# Patient Record
Sex: FEMALE | Race: WHITE | Employment: UNEMPLOYED | URBAN - METROPOLITAN AREA
[De-identification: names, ages, dates, MRNs, and addresses within clinical notes are randomized per-mention and may not be internally consistent; named-entity substitution may affect disease eponyms.]

---

## 2022-02-28 ENCOUNTER — HOSPITAL ENCOUNTER (INPATIENT)
Age: 42
LOS: 8 days | Discharge: HOME | DRG: 751 | End: 2022-03-09
Attending: EMERGENCY MEDICINE | Admitting: PSYCHIATRY & NEUROLOGY
Payer: MEDICARE

## 2022-02-28 DIAGNOSIS — F41.1 GAD (GENERALIZED ANXIETY DISORDER): ICD-10-CM

## 2022-02-28 DIAGNOSIS — T14.91XA SUICIDE ATTEMPT (HCC): Primary | ICD-10-CM

## 2022-02-28 DIAGNOSIS — F32.A DEPRESSION, UNSPECIFIED DEPRESSION TYPE: ICD-10-CM

## 2022-02-28 PROBLEM — F32.9 MAJOR DEPRESSIVE DISORDER WITHOUT PSYCHOTIC FEATURES: Status: ACTIVE | Noted: 2022-02-28

## 2022-02-28 LAB
A/G RATIO: 1.1 (ref 1.1–2.2)
ACETAMINOPHEN LEVEL: <5 UG/ML (ref 10–30)
ALBUMIN SERPL-MCNC: 3.8 G/DL (ref 3.4–5)
ALP BLD-CCNC: 156 U/L (ref 40–129)
ALT SERPL-CCNC: 34 U/L (ref 10–40)
AMPHETAMINE SCREEN, URINE: ABNORMAL
ANION GAP SERPL CALCULATED.3IONS-SCNC: 11 MMOL/L (ref 3–16)
AST SERPL-CCNC: 33 U/L (ref 15–37)
BARBITURATE SCREEN URINE: ABNORMAL
BASOPHILS ABSOLUTE: 0.1 K/UL (ref 0–0.2)
BASOPHILS RELATIVE PERCENT: 0.8 %
BENZODIAZEPINE SCREEN, URINE: POSITIVE
BILIRUB SERPL-MCNC: 0.3 MG/DL (ref 0–1)
BUN BLDV-MCNC: 6 MG/DL (ref 7–20)
CALCIUM SERPL-MCNC: 9.1 MG/DL (ref 8.3–10.6)
CANNABINOID SCREEN URINE: ABNORMAL
CHLORIDE BLD-SCNC: 105 MMOL/L (ref 99–110)
CO2: 24 MMOL/L (ref 21–32)
COCAINE METABOLITE SCREEN URINE: ABNORMAL
CREAT SERPL-MCNC: 0.7 MG/DL (ref 0.6–1.1)
EOSINOPHILS ABSOLUTE: 0.1 K/UL (ref 0–0.6)
EOSINOPHILS RELATIVE PERCENT: 0.8 %
ETHANOL: NORMAL MG/DL (ref 0–0.08)
GFR AFRICAN AMERICAN: >60
GFR NON-AFRICAN AMERICAN: >60
GLUCOSE BLD-MCNC: 115 MG/DL (ref 70–99)
HCG QUALITATIVE: NEGATIVE
HCT VFR BLD CALC: 45.2 % (ref 36–48)
HEMOGLOBIN: 15.4 G/DL (ref 12–16)
LYMPHOCYTES ABSOLUTE: 0.8 K/UL (ref 1–5.1)
LYMPHOCYTES RELATIVE PERCENT: 11.4 %
Lab: ABNORMAL
MCH RBC QN AUTO: 29.4 PG (ref 26–34)
MCHC RBC AUTO-ENTMCNC: 34.1 G/DL (ref 31–36)
MCV RBC AUTO: 86.3 FL (ref 80–100)
METHADONE SCREEN, URINE: ABNORMAL
MONOCYTES ABSOLUTE: 0.5 K/UL (ref 0–1.3)
MONOCYTES RELATIVE PERCENT: 7.2 %
NEUTROPHILS ABSOLUTE: 5.8 K/UL (ref 1.7–7.7)
NEUTROPHILS RELATIVE PERCENT: 79.8 %
OPIATE SCREEN URINE: ABNORMAL
OXYCODONE URINE: ABNORMAL
PDW BLD-RTO: 13.3 % (ref 12.4–15.4)
PH UA: 7
PHENCYCLIDINE SCREEN URINE: ABNORMAL
PLATELET # BLD: 251 K/UL (ref 135–450)
PMV BLD AUTO: 9 FL (ref 5–10.5)
POTASSIUM REFLEX MAGNESIUM: 3.9 MMOL/L (ref 3.5–5.1)
PROPOXYPHENE SCREEN: ABNORMAL
RBC # BLD: 5.24 M/UL (ref 4–5.2)
SALICYLATE, SERUM: 0.7 MG/DL (ref 15–30)
SALICYLATE, SERUM: <0.3 MG/DL (ref 15–30)
SARS-COV-2, NAAT: NOT DETECTED
SODIUM BLD-SCNC: 140 MMOL/L (ref 136–145)
TOTAL PROTEIN: 7.3 G/DL (ref 6.4–8.2)
WBC # BLD: 7.2 K/UL (ref 4–11)

## 2022-02-28 PROCEDURE — 82077 ASSAY SPEC XCP UR&BREATH IA: CPT

## 2022-02-28 PROCEDURE — 84443 ASSAY THYROID STIM HORMONE: CPT

## 2022-02-28 PROCEDURE — 84439 ASSAY OF FREE THYROXINE: CPT

## 2022-02-28 PROCEDURE — 85025 COMPLETE CBC W/AUTO DIFF WBC: CPT

## 2022-02-28 PROCEDURE — 93005 ELECTROCARDIOGRAM TRACING: CPT | Performed by: EMERGENCY MEDICINE

## 2022-02-28 PROCEDURE — 80307 DRUG TEST PRSMV CHEM ANLYZR: CPT

## 2022-02-28 PROCEDURE — 80143 DRUG ASSAY ACETAMINOPHEN: CPT

## 2022-02-28 PROCEDURE — 6370000000 HC RX 637 (ALT 250 FOR IP): Performed by: EMERGENCY MEDICINE

## 2022-02-28 PROCEDURE — 80061 LIPID PANEL: CPT

## 2022-02-28 PROCEDURE — 87635 SARS-COV-2 COVID-19 AMP PRB: CPT

## 2022-02-28 PROCEDURE — 83036 HEMOGLOBIN GLYCOSYLATED A1C: CPT

## 2022-02-28 PROCEDURE — 80179 DRUG ASSAY SALICYLATE: CPT

## 2022-02-28 PROCEDURE — 99285 EMERGENCY DEPT VISIT HI MDM: CPT

## 2022-02-28 PROCEDURE — 36415 COLL VENOUS BLD VENIPUNCTURE: CPT

## 2022-02-28 PROCEDURE — 84703 CHORIONIC GONADOTROPIN ASSAY: CPT

## 2022-02-28 PROCEDURE — 80053 COMPREHEN METABOLIC PANEL: CPT

## 2022-02-28 RX ORDER — LORAZEPAM 1 MG/1
1 TABLET ORAL ONCE
Status: COMPLETED | OUTPATIENT
Start: 2022-02-28 | End: 2022-02-28

## 2022-02-28 RX ORDER — HYDROXYZINE HYDROCHLORIDE 10 MG/1
50 TABLET, FILM COATED ORAL 3 TIMES DAILY PRN
Status: DISCONTINUED | OUTPATIENT
Start: 2022-02-28 | End: 2022-03-01

## 2022-02-28 RX ORDER — OLANZAPINE 10 MG/1
10 TABLET ORAL EVERY 8 HOURS PRN
Status: DISCONTINUED | OUTPATIENT
Start: 2022-02-28 | End: 2022-03-01

## 2022-02-28 RX ORDER — DIPHENHYDRAMINE HYDROCHLORIDE 50 MG/ML
50 INJECTION INTRAMUSCULAR; INTRAVENOUS EVERY 4 HOURS PRN
Status: DISCONTINUED | OUTPATIENT
Start: 2022-02-28 | End: 2022-03-01

## 2022-02-28 RX ORDER — TRAZODONE HYDROCHLORIDE 50 MG/1
50 TABLET ORAL NIGHTLY PRN
Status: DISCONTINUED | OUTPATIENT
Start: 2022-03-01 | End: 2022-03-01

## 2022-02-28 RX ORDER — ACETAMINOPHEN 325 MG/1
650 TABLET ORAL EVERY 4 HOURS PRN
Status: DISCONTINUED | OUTPATIENT
Start: 2022-02-28 | End: 2022-03-01

## 2022-02-28 RX ORDER — POLYETHYLENE GLYCOL 3350 17 G
2 POWDER IN PACKET (EA) ORAL
Status: DISCONTINUED | OUTPATIENT
Start: 2022-02-28 | End: 2022-03-09 | Stop reason: HOSPADM

## 2022-02-28 RX ORDER — IBUPROFEN 400 MG/1
400 TABLET ORAL EVERY 6 HOURS PRN
Status: DISCONTINUED | OUTPATIENT
Start: 2022-02-28 | End: 2022-03-01

## 2022-02-28 RX ORDER — MAGNESIUM HYDROXIDE/ALUMINUM HYDROXICE/SIMETHICONE 120; 1200; 1200 MG/30ML; MG/30ML; MG/30ML
30 SUSPENSION ORAL EVERY 6 HOURS PRN
Status: DISCONTINUED | OUTPATIENT
Start: 2022-02-28 | End: 2022-03-09 | Stop reason: HOSPADM

## 2022-02-28 RX ORDER — OLANZAPINE 10 MG/1
10 INJECTION, POWDER, LYOPHILIZED, FOR SOLUTION INTRAMUSCULAR EVERY 8 HOURS PRN
Status: DISCONTINUED | OUTPATIENT
Start: 2022-02-28 | End: 2022-03-01

## 2022-02-28 RX ADMIN — LORAZEPAM 1 MG: 1 TABLET ORAL at 19:44

## 2022-02-28 NOTE — ED PROVIDER NOTES
[address] ED  EMERGENCY DEPARTMENT ENCOUNTER      Pt Name: Wendy Glaser  MRN: [id number]  [place] 1980  Date of evaluation: 2/28/2022  Provider: Sammi Wells MD    CHIEF COMPLAINT       Chief Complaint   Patient presents with    Suicide Attempt     patient has bilateral superficial cuts to wrists. States \"i just dont want to live anymore\". Patient reported to have been struggling with depression and has made a suicidal attempts in the past.          HISTORY OF PRESENT ILLNESS   (Location/Symptom, Timing/Onset, Context/Setting, Quality, Duration, Modifying Factors, Severity)  Note limiting factors. Wendy Glaser is a 42 y.o. female with past medical history of ADHD, anxiety, depression, fibromyalgia and Raynaud's disease here today after suicide attempt    Patient states she has dealt with depression all of her life. States she has had suicidal thoughts for as long as she can remember and has attempted to kill her self numerous times in the past by various methods. She states that last night she was struggling and tried again to commit suicide. States she used a  to cut her wrists bilaterally. Denies any ingestions. States she feels depressed. States there is no specific incident that pushed her to the edge. Denies hallucinations. Denies substance abuse    Tetanus is up-to-date    HPI    Nursing Notes were reviewed. REVIEW OF SYSTEMS    (2-9 systems for level 4, 10 or more for level 5)     Review of Systems    Please see HPI for pertinent positive and negative review of system findings. A full 10 system ROS was performed and otherwise negative. PAST MEDICAL HISTORY     Past Medical History:   Diagnosis Date    ADHD (attention deficit hyperactivity disorder)     Anxiety     Depression     Fibromyalgia     Raynaud's disease          SURGICAL HISTORY     History reviewed. No pertinent surgical history.       CURRENT MEDICATIONS       Previous Medications No medications on file       ALLERGIES     Patient has no known allergies. FAMILY HISTORY     History reviewed. No pertinent family history. SOCIAL HISTORY       Social History     Socioeconomic History    Marital status:      Spouse name: None    Number of children: None    Years of education: None    Highest education level: None   Occupational History    None   Tobacco Use    Smoking status: Current Every Day Smoker     Packs/day: 1.00    Smokeless tobacco: None   Substance and Sexual Activity    Alcohol use: Never    Drug use: Never    Sexual activity: None   Other Topics Concern    None   Social History Narrative    None     Social Determinants of Health     Financial Resource Strain:     Difficulty of Paying Living Expenses: Not on file   Food Insecurity:     Worried About Running Out of Food in the Last Year: Not on file    Amaya of Food in the Last Year: Not on file   Transportation Needs:     Lack of Transportation (Medical): Not on file    Lack of Transportation (Non-Medical):  Not on file   Physical Activity:     Days of Exercise per Week: Not on file    Minutes of Exercise per Session: Not on file   Stress:     Feeling of Stress : Not on file   Social Connections:     Frequency of Communication with Friends and Family: Not on file    Frequency of Social Gatherings with Friends and Family: Not on file    Attends Jainism Services: Not on file    Active Member of [address] [place] [address] or Organizations: Not on file    Attends Club or Organization Meetings: Not on file    Marital Status: Not on file   Intimate Partner Violence:     Fear of Current or Ex-Partner: Not on file    Emotionally Abused: Not on file    Physically Abused: Not on file    Sexually Abused: Not on file   Housing Stability:     Unable to Pay for Housing in the Last Year: Not on file    Number of [place] in the Last Year: Not on file    Unstable Housing in the Last Year: Not on file       SCREENINGS Nicolette Coma Scale  Eye Opening: Spontaneous  Best Verbal Response: Oriented  Best Motor Response: Obeys commands  Nicolette Coma Scale Score: 15          PHYSICAL EXAM    (up to 7 for level 4, 8 or more for level 5)     ED Triage Vitals [02/28/22 1615]   BP Temp Temp src Pulse Resp SpO2 Height Weight   (!) 127/91 -- -- 100 18 95 % 5' 2\" (1.575 m) 180 lb (81.6 kg)       Physical Exam    General appearance:  Cooperative. No acute distress. Skin:  Warm. Dry. Multiple very superficial abrasions and lacerations to the bilateral volar wrists without active bleeding  Eye:  Extraocular movements intact. Ears, nose, mouth and throat:  Oral mucosa moist,  Neck:  Trachea midline. Heart:  Regular rate and rhythm  Perfusion:  intact with 2+ bilateral radial pulses  Respiratory:  Lungs clear to auscultation bilaterally. Respirations nonlabored. Abdominal:   Non distended. Nontender  Neurological:  Alert and oriented x 3. Moves all extremities spontaneously  Musculoskeletal:   Normal ROM, no deformities          Psychiatric: Depressed mood with flat affect.   Denies hallucinations      DIAGNOSTIC RESULTS       Labs Reviewed   CBC WITH AUTO DIFFERENTIAL - Abnormal; Notable for the following components:       Result Value    RBC 5.24 (*)     Lymphocytes Absolute 0.8 (*)     All other components within normal limits    Narrative:     Performed at:  [facility] [address],  [place], [facility]   Phone [phone]   COMPREHENSIVE METABOLIC PANEL W/ REFLEX TO MG FOR LOW K - Abnormal; Notable for the following components:    Glucose 115 (*)     BUN 6 (*)     Alkaline Phosphatase 156 (*)     All other components within normal limits    Narrative:     Performed at:  [facility]) - [facility] [address],  [place], [facility]   Phone [phone]   [address] - Abnormal; Notable for the following components:    Benzodiazepine Screen, Urine POSITIVE (*)     All other components within normal limits    Narrative:     Performed at:  [facility]) - [facility] [address],  [place], [employer]   Phone [phone]   SALICYLATE LEVEL - Abnormal; Notable for the following components:    Salicylate, Serum 0.7 (*)     All other components within normal limits    Narrative:     Performed at:  [facility] [address],  [place], [employer]   Phone [phone]   ACETAMINOPHEN LEVEL - Abnormal; Notable for the following components:    Acetaminophen Level <5 (*)     All other components within normal limits    Narrative:     Performed at:  [place] [address],  [place], [employer]   Phone [phone]   SALICYLATE LEVEL - Abnormal; Notable for the following components:    Salicylate, Serum <2.9 (*)     All other components within normal limits    Narrative:     Performed at:  [facility] [address],  [place], [employer]   Phone [phone], RAPID    Narrative:     Performed at:  [facility] [address],  [place], [employer]   Phone [phone]   HCG, SERUM, QUALITATIVE    Narrative:     Performed at:  [facility] [address],  [place], [employer]   Phone [phone]   ETHANOL    Narrative:     Performed at:  [facility] [address],  [place], [employer]   Phone [phone]       Interpretation per the Radiologist below, if obtained/available at the time of this note:    No orders to display       All other labs/imaging were within normal range or not returned as of this dictation.     EMERGENCY DEPARTMENT COURSE and DIFFERENTIAL DIAGNOSIS/MDM:   Vitals:    Vitals:    02/28/22 1615   BP: (!) 127/91   Pulse: 100   Resp: 18   SpO2: 95%   Weight: 180 lb (81.6 kg)   Height: 5' 2\" (1.575 m)       Patient presents to the emergency department today after reported suicide attempt. Multiple superficial lacerations to the bilateral wrist.  Tetanus up-to-date. Did not require closure. Denies any other coingestions but found to have just slightly elevated salicylate level prompting repeat which was undetectable. Patient medically cleared for admission. Additional history from patient and family note that she has been essentially locked up in her room refusing to leave, bathe, clean herself or even eat at times for over a month. Certainly concerning for significant depression and suicidal thoughts. Psychiatry has plan to admit the patient for further treatment    MDM    CONSULTS     None    Critical Care:   None    REASSESSMENT          PROCEDURE     Unless otherwise noted below, none     Procedures      FINAL IMPRESSION      1. Suicide attempt ([facility] [address]    2. Depression, unspecified depression type            DISPOSITION/PLAN   DISPOSITION Decision To Admit 02/28/2022 09:34:13 PM        PATIENT REFERRED TO:  No follow-up provider specified. DISCHARGE MEDICATIONS:  New Prescriptions    No medications on file     Controlled Substances Monitoring:     No flowsheet data found.     (Please note that portions of this note were completed with a voice recognition program.  Efforts were made to edit the dictations but occasionally words are mis-transcribed.)    Sheryl Emmanuel MD (electronically signed)  Attending Emergency Physician            Vega Trujillo MD  02/28/22 6084

## 2022-03-01 PROBLEM — R79.89 LOW TSH LEVEL: Status: ACTIVE | Noted: 2022-03-01

## 2022-03-01 PROBLEM — F32.9 MAJOR DEPRESSIVE DISORDER WITHOUT PSYCHOTIC FEATURES: Status: RESOLVED | Noted: 2022-02-28 | Resolved: 2022-03-01

## 2022-03-01 PROBLEM — F40.01 PANIC DISORDER WITH AGORAPHOBIA: Status: ACTIVE | Noted: 2022-03-01

## 2022-03-01 PROBLEM — F41.1 GAD (GENERALIZED ANXIETY DISORDER): Status: ACTIVE | Noted: 2022-03-01

## 2022-03-01 PROBLEM — F33.3 MAJOR DEPRESSIVE DISORDER, RECURRENT, SEVERE WITH PSYCHOTIC FEATURES (HCC): Status: ACTIVE | Noted: 2022-03-01

## 2022-03-01 PROBLEM — G43.909 MIGRAINES: Status: ACTIVE | Noted: 2022-03-01

## 2022-03-01 LAB
CHOLESTEROL, TOTAL: 270 MG/DL (ref 0–199)
EKG ATRIAL RATE: 87 BPM
EKG DIAGNOSIS: NORMAL
EKG P AXIS: 4 DEGREES
EKG P-R INTERVAL: 134 MS
EKG Q-T INTERVAL: 376 MS
EKG QRS DURATION: 72 MS
EKG QTC CALCULATION (BAZETT): 452 MS
EKG R AXIS: 40 DEGREES
EKG T AXIS: 27 DEGREES
EKG VENTRICULAR RATE: 87 BPM
HDLC SERPL-MCNC: 37 MG/DL (ref 40–60)
LDL CHOLESTEROL CALCULATED: 196 MG/DL
T4 FREE: 1 NG/DL (ref 0.9–1.8)
TRIGL SERPL-MCNC: 185 MG/DL (ref 0–150)
TSH SERPL DL<=0.05 MIU/L-ACNC: <0.01 UIU/ML (ref 0.27–4.2)
VLDLC SERPL CALC-MCNC: 37 MG/DL

## 2022-03-01 PROCEDURE — 99221 1ST HOSP IP/OBS SF/LOW 40: CPT | Performed by: NURSE PRACTITIONER

## 2022-03-01 PROCEDURE — 1240000000 HC EMOTIONAL WELLNESS R&B

## 2022-03-01 PROCEDURE — 99223 1ST HOSP IP/OBS HIGH 75: CPT | Performed by: PSYCHIATRY & NEUROLOGY

## 2022-03-01 PROCEDURE — 6370000000 HC RX 637 (ALT 250 FOR IP)

## 2022-03-01 PROCEDURE — 6370000000 HC RX 637 (ALT 250 FOR IP): Performed by: PSYCHIATRY & NEUROLOGY

## 2022-03-01 PROCEDURE — 93010 ELECTROCARDIOGRAM REPORT: CPT | Performed by: INTERNAL MEDICINE

## 2022-03-01 RX ORDER — TRAZODONE HYDROCHLORIDE 50 MG/1
50 TABLET ORAL ONCE
Status: COMPLETED | OUTPATIENT
Start: 2022-03-01 | End: 2022-03-01

## 2022-03-01 RX ORDER — ACETAMINOPHEN 325 MG/1
650 TABLET ORAL EVERY 4 HOURS PRN
Status: DISCONTINUED | OUTPATIENT
Start: 2022-03-01 | End: 2022-03-09 | Stop reason: HOSPADM

## 2022-03-01 RX ORDER — DEXTROAMPHETAMINE SACCHARATE, AMPHETAMINE ASPARTATE, DEXTROAMPHETAMINE SULFATE AND AMPHETAMINE SULFATE 7.5; 7.5; 7.5; 7.5 MG/1; MG/1; MG/1; MG/1
30 TABLET ORAL DAILY
Status: ON HOLD | COMMUNITY
Start: 2022-02-28 | End: 2022-03-09 | Stop reason: HOSPADM

## 2022-03-01 RX ORDER — MIRTAZAPINE 45 MG/1
45 TABLET, FILM COATED ORAL NIGHTLY
Status: ON HOLD | COMMUNITY
Start: 2022-02-28 | End: 2022-03-09 | Stop reason: HOSPADM

## 2022-03-01 RX ORDER — ALPRAZOLAM 1 MG/1
2 TABLET ORAL 3 TIMES DAILY PRN
Status: DISCONTINUED | OUTPATIENT
Start: 2022-03-01 | End: 2022-03-04

## 2022-03-01 RX ORDER — VENLAFAXINE HYDROCHLORIDE 75 MG/1
75 CAPSULE, EXTENDED RELEASE ORAL
Status: COMPLETED | OUTPATIENT
Start: 2022-03-01 | End: 2022-03-01

## 2022-03-01 RX ORDER — VENLAFAXINE HYDROCHLORIDE 75 MG/1
150 CAPSULE, EXTENDED RELEASE ORAL
Status: DISCONTINUED | OUTPATIENT
Start: 2022-03-02 | End: 2022-03-03

## 2022-03-01 RX ORDER — ALPRAZOLAM 2 MG/1
2 TABLET ORAL 3 TIMES DAILY PRN
Status: ON HOLD | COMMUNITY
Start: 2022-02-28 | End: 2022-03-09 | Stop reason: HOSPADM

## 2022-03-01 RX ORDER — DEXTROAMPHETAMINE SACCHARATE, AMPHETAMINE ASPARTATE, DEXTROAMPHETAMINE SULFATE AND AMPHETAMINE SULFATE 2.5; 2.5; 2.5; 2.5 MG/1; MG/1; MG/1; MG/1
30 TABLET ORAL DAILY
Status: DISCONTINUED | OUTPATIENT
Start: 2022-03-01 | End: 2022-03-07

## 2022-03-01 RX ADMIN — HYDROXYZINE HYDROCHLORIDE 50 MG: 10 TABLET ORAL at 03:06

## 2022-03-01 RX ADMIN — ACETAMINOPHEN 650 MG: 325 TABLET ORAL at 18:34

## 2022-03-01 RX ADMIN — DEXTROAMPHETAMINE SACCHARATE, AMPHETAMINE ASPARTATE, DEXTROAMPHETAMINE SULFATE AND AMPHETAMINE SULFATE 30 MG: 2.5; 2.5; 2.5; 2.5 TABLET ORAL at 10:26

## 2022-03-01 RX ADMIN — VENLAFAXINE HYDROCHLORIDE 75 MG: 75 CAPSULE, EXTENDED RELEASE ORAL at 14:32

## 2022-03-01 RX ADMIN — ALPRAZOLAM 2 MG: 1 TABLET ORAL at 10:26

## 2022-03-01 RX ADMIN — ALPRAZOLAM 2 MG: 1 TABLET ORAL at 20:56

## 2022-03-01 RX ADMIN — TRAZODONE HYDROCHLORIDE 50 MG: 50 TABLET ORAL at 03:06

## 2022-03-01 ASSESSMENT — SLEEP AND FATIGUE QUESTIONNAIRES
DIFFICULTY FALLING ASLEEP: YES
DIFFICULTY ARISING: NO
DO YOU USE A SLEEP AID: YES
DO YOU USE A SLEEP AID: YES
RESTFUL SLEEP: NO
SLEEP PATTERN: DIFFICULTY FALLING ASLEEP;RESTLESSNESS;NIGHTMARES/TERRORS;INSOMNIA
AVERAGE NUMBER OF SLEEP HOURS: 4
DO YOU HAVE DIFFICULTY SLEEPING: YES
DIFFICULTY STAYING ASLEEP: YES
DO YOU HAVE DIFFICULTY SLEEPING: YES
AVERAGE NUMBER OF SLEEP HOURS: 4
DIFFICULTY FALLING ASLEEP: YES
RESTFUL SLEEP: NO
DIFFICULTY STAYING ASLEEP: YES
DIFFICULTY ARISING: NO

## 2022-03-01 ASSESSMENT — LIFESTYLE VARIABLES
HISTORY_ALCOHOL_USE: NO
HISTORY_ALCOHOL_USE: NO

## 2022-03-01 ASSESSMENT — PAIN SCALES - GENERAL
PAINLEVEL_OUTOF10: 0
PAINLEVEL_OUTOF10: 6

## 2022-03-01 ASSESSMENT — PATIENT HEALTH QUESTIONNAIRE - PHQ9: SUM OF ALL RESPONSES TO PHQ QUESTIONS 1-9: 25
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
## 2022-03-01 NOTE — PROGRESS NOTES
Patient complaining of burning neck pain radiating down her back. States her pain is a 6 on a 1-10. Tylenol given per order.   See [facility]

## 2022-03-01 NOTE — BH NOTE
Medication verified with pharmacy. Only difference is Adderall is supposed to be taken two times daily (BID).

## 2022-03-01 NOTE — H&P
Hospital Medicine History & Physical      PCP: No primary care provider on file. Date of Admission: 2/28/2022    Date of Service: Pt seen/examined on 3/1/2022     Chief Complaint:    Chief Complaint   Patient presents with    Suicide Attempt     patient has bilateral superficial cuts to wrists. States \"i just dont want to live anymore\". Patient reported to have been struggling with depression and has made a suicidal attempts in the past.        History Of Present Illness: The patient is a 42 y.o. female with Raynaud's, fibromyalgia, depression, anxiety, ADHD who presented to [facility] ED with complaint of Suicide attempt by cutting her wrists. Patient was seen and evaluated in the ED by the ED medical provider, patient was medically cleared for admission to [facility] at [facility]. This note serves as an admission medical H&P.    PCP: No primary care provider on file. Tobacco use: current  ETOH use: denies  Illicit drug use: denies    Patient denies any symptoms/complaints. Past Medical History:        Diagnosis Date    ADHD (attention deficit hyperactivity disorder)     Anxiety     Depression     Fibromyalgia     Raynaud's disease        Past Surgical History:    History reviewed. No pertinent surgical history. Medications Prior to Admission:    Prior to Admission medications    Medication Sig Start Date End Date Taking? Authorizing Provider   ALPRAZolam Chana Nice) 2 MG tablet Take 2 mg by mouth 3 times daily as needed for Anxiety. 2/28/22  Yes Historical Provider, MD   amphetamine-dextroamphetamine (ADDERALL) 30 MG tablet Take 30 mg by mouth daily. 2/28/22  Yes Historical Provider, MD   mirtazapine (REMERON) 45 MG tablet Take 45 mg by mouth nightly 2/28/22  Yes Historical Provider, MD       Allergies:  Patient has no known allergies. Social History:     TOBACCO:   reports that she has been smoking cigarettes. She started smoking about 20 years ago. She has a 10.00 pack-year smoking history.  She has never used smokeless tobacco.  ETOH:   reports no history of alcohol use. Family History:   Positive as follows:    History reviewed. No pertinent family history. REVIEW OF SYSTEMS:       Constitutional: Negative for fever   HENT: Negative for sore throat   Respiratory: Negative  for dyspnea, cough   Cardiovascular: Negative for chest pain   Gastrointestinal: Negative for vomiting, diarrhea   Genitourinary: Negative for dysuria  Musculoskeletal: Negative for arthralgias   Skin: Negative for rash   Neurological: Negative for syncope   Psychiatric/Behavorial: per psychiatric evaluation    PHYSICAL EXAM:    BP 97/71   Pulse 81   Temp 97 °F (36.1 °C) (Temporal)   Resp 16   Ht 5' 2\" (1.575 m)   Wt 180 lb (81.6 kg)   LMP  (LMP Unknown)   SpO2 97%   Breastfeeding No   BMI 32.92 kg/m²     Gen: No distress. Alert. Eyes: PERRL. No sclera icterus. No conjunctival injection. ENT: No discharge. Pharynx clear. Neck:  Trachea midline. Resp: No accessory muscle use. No crackles. No wheezes. No rhonchi. CV: Regular rate. Regular rhythm. No murmur. No rub. No edema. GI: Non-tender. Non-distended. Normal bowel sounds. Skin: Warm and dry. No nodule on exposed extremities. No rash on exposed extremities. M/S: No cyanosis. No joint deformity. No clubbing. Neuro: Awake. No focal neurologic deficit on exam.  Cranial nerves II through XII intact. Patient is able to ambulate without difficulty.   Psych: Per psychiatry team evaluation       CBC:   Recent Labs     02/28/22  1634   WBC 7.2   HGB 15.4   HCT 45.2   MCV 86.3        BMP:   Recent Labs     02/28/22  1634      K 3.9      CO2 24   BUN 6*   CREATININE 0.7     LIVER PROFILE:   Recent Labs     02/28/22  1634   AST 33   ALT 34   BILITOT 0.3   ALKPHOS 156*     UA:  Recent Labs     02/28/22  1726   PHUR 7.0         CULTURES  COVID: not detected    EKG:  I have reviewed the EKG with the following interpretation:   Normal sinus rhythm, Baseline artifact, Nonspecific ST abnormality    ASSESSMENT/PLAN:  Fibromyalgia, raynaud's disease  - supportive care measures. Tylenol prn    Depression, anxiety, SI  - management per psychiatry    Tobacco Dependence  -Recommended cessation  - Nicotine lozenge prn    Pt has no medical complaints at this time. They were informed that should a medical concern arise during their admission they may have I contact us.     Ramses Savage FNP-C  3/1/2022 9:59 AM

## 2022-03-01 NOTE — PLAN OF CARE
Problem: Suicide risk  Goal: Provide patient with safe environment  Description: Provide patient with safe environment  Outcome: Ongoing     Problem: Tobacco Use:  Goal: Inpatient tobacco use cessation counseling participation  Description: Inpatient tobacco use cessation counseling participation  Outcome: Ongoing     Problem: Anxiety:  Goal: Level of anxiety will decrease  Description: Level of anxiety will decrease  Outcome: Ongoing     Problem: Pain:  Goal: Pain level will decrease  Description: Pain level will decrease  Outcome: Ongoing  Goal: Control of acute pain  Description: Control of acute pain  Outcome: Ongoing  Goal: Control of chronic pain  Description: Control of chronic pain  Outcome: Ongoing     Problem: Altered Mood, Depressive Behavior:  Goal: Able to verbalize acceptance of life and situations over which he or she has no control  Description: Able to verbalize acceptance of life and situations over which he or she has no control  Outcome: Ongoing  Goal: Able to verbalize and/or display a decrease in depressive symptoms  Description: Able to verbalize and/or display a decrease in depressive symptoms  Outcome: Ongoing  Goal: Able to verbalize support systems  Description: Able to verbalize support systems  Outcome: Ongoing  Goal: Absence of self-harm  Description: Absence of self-harm  Outcome: Ongoing  Goal: Participates in care planning  Description: Participates in care planning  Outcome: Ongoing    Patient is interactive with staff. Patient denies SI/HI/AVH. Patient states, 'I had trouble sleeping last night. \" Patient verbalizes they will notify staff if suicidal thoughts worsen or is unable to CFS. She reports feeling \"tired and anxious. \" Patient compliant with medications. Patient is withdrawn to bed and room.

## 2022-03-01 NOTE — BH NOTE
Going through patient's belongings - 2 pills found in patient's jacket pocket. Contacted pharmacy - they stated they are 30 mg of Adderall. Two pills put in lock box [facility]
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
## 2022-03-01 NOTE — CARE COORDINATION
[facility]  Treatment Team Note  Day 1    Review Date & Time: 0900  3/1/2022    Patient was not in treatment team      Status EXAM:   Status and Exam  Normal: No  Facial Expression: Sad  Affect: Unstable  Level of Consciousness: Alert  Mood:Normal: No  Mood: Depressed,Anxious,Sad  Motor Activity:Normal: Yes  Motor Activity: Decreased  Interview Behavior: Cooperative  Preception: [place] to Person,[place] to Place,[place] to [place] to Time  Attention:Normal: Yes  Attention: Distractible  Thought Processes: Circumstantial  Thought Content:Normal: Yes  Hallucinations: None  Delusions: No  Memory:Normal: Yes  Insight and Judgment: No  Insight and Judgment: Poor Judgment,Poor Insight  Present Suicidal Ideation: No  Present Homicidal Ideation: No      Suicide Risk CSSR-S:  1) Within the past month, have you wished you were dead or wished you could go to sleep and not wake up? : Yes  2) Have you actually had any thoughts of killing yourself? : Yes  3) Have you been thinking about how you might kill yourself? : Yes  5) Have you started to work out or worked out the details of how to kill yourself?  Do you intend to carry out this plan? : Yes  6) Have you ever done anything, started to do anything, or prepared to do anything to end your life?: Yes      PLAN/TREATMENT RECOMMENDATIONS UPDATE: Patient will take medication as prescribed, eat 75% of meals, attend groups, participate in milieu activities, participate in treatment team and care planning for discharge and follow up.,      Yang Sanchez RN

## 2022-03-01 NOTE — PROGRESS NOTES
skills (new ways to manage stress, exercise, relaxation techniques, changing routine, distraction)                                                           ( X)  Basic information about quitting (benefits of quitting, techniques in how to quit, available resources  ( ) Referral for counseling faxed to [facility]                                           ( ) Patient refused counseling  ( ) Patient has not smoked in the last 30 days    Metabolic Screening:    No results found for: LABA1C    No results found for: CHOL  No results found for: TRIG  No results found for: HDL  No components found for: LDLCAL  No results found for: LABVLDL      Body mass index is 32.92 kg/m². BP Readings from Last 2 Encounters:   03/01/22 109/80           Pt admitted with followings belongings:  Dental Appliances: None  Vision - Corrective Lenses: None  Hearing Aid: None  Jewelry: None  Body Piercings Removed: N/A  Clothing: Footwear,Pants,Shirt,Undergarments (Comment) (1 sock)  Were All Patient Medications Collected?: Yes  Other Valuables: Purse,Cell phone,Wallet ( make up)     Patient's home medication was noted, recorded and placed in her locker. Patient oriented to surroundings and program expectations and copy of patient rights given. Received admission packet:  YES. Consents reviewed, signed YES. Refused voluntary, SHIRLEY. Patient verbalize understanding:  YES.   Patient education on precautions: Osbaldo Dias RN
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
## 2022-03-01 NOTE — ED NOTES
Level of Care Disposition: Admit      Patient was seen by ED provider and [facility] staff. The case presented to psychiatric provider on-call Connie Nurse, APRN-JIMBO. Based on the ED evaluation and information presented to the provider by Jerry Haro it is the recommendation of the on call psychiatric provider that inpatient hospitalization is the least restrictive environment for the patient at this time. The patient will be admitted to the inpatient unit. Admitting provider did not order suicide precautions based on patient reuben for safety.            Erin Clark RN  02/28/22 [id number]

## 2022-03-01 NOTE — PROGRESS NOTES
Behavioral Services  Medicare Certification Upon Admission    I certify that this patient's inpatient psychiatric hospital admission is medically necessary for:    [x] (1) Treatment which could reasonably be expected to improve this patient's condition,       [x] (2) Or for diagnostic study;     AND     [x](2) The inpatient psychiatric services are provided while the individual is under the care of a physician and are included in the individualized plan of care.     Estimated length of stay/service 5-8 days    Plan for post-hospital care incomplete     Electronically signed by Kristie Alfred MD on 3/1/2022 at 2:14 PM

## 2022-03-02 LAB
ESTIMATED AVERAGE GLUCOSE: 102.5 MG/DL
HBA1C MFR BLD: 5.2 %

## 2022-03-02 PROCEDURE — 6370000000 HC RX 637 (ALT 250 FOR IP): Performed by: NURSE PRACTITIONER

## 2022-03-02 PROCEDURE — 99233 SBSQ HOSP IP/OBS HIGH 50: CPT | Performed by: PSYCHIATRY & NEUROLOGY

## 2022-03-02 PROCEDURE — 1240000000 HC EMOTIONAL WELLNESS R&B

## 2022-03-02 PROCEDURE — 6370000000 HC RX 637 (ALT 250 FOR IP): Performed by: PSYCHIATRY & NEUROLOGY

## 2022-03-02 RX ORDER — ACETAMINOPHEN, ASPIRIN AND CAFFEINE 250; 250; 65 MG/1; MG/1; MG/1
1 TABLET, FILM COATED ORAL 2 TIMES DAILY PRN
Status: DISCONTINUED | OUTPATIENT
Start: 2022-03-02 | End: 2022-03-09 | Stop reason: HOSPADM

## 2022-03-02 RX ORDER — TRAZODONE HYDROCHLORIDE 50 MG/1
50 TABLET ORAL ONCE
Status: COMPLETED | OUTPATIENT
Start: 2022-03-02 | End: 2022-03-02

## 2022-03-02 RX ADMIN — DEXTROAMPHETAMINE SACCHARATE, AMPHETAMINE ASPARTATE, DEXTROAMPHETAMINE SULFATE AND AMPHETAMINE SULFATE 30 MG: 2.5; 2.5; 2.5; 2.5 TABLET ORAL at 08:47

## 2022-03-02 RX ADMIN — TRAZODONE HYDROCHLORIDE 50 MG: 50 TABLET ORAL at 22:29

## 2022-03-02 RX ADMIN — ACETAMINOPHEN 650 MG: 325 TABLET ORAL at 08:57

## 2022-03-02 RX ADMIN — ALPRAZOLAM 2 MG: 1 TABLET ORAL at 15:18

## 2022-03-02 RX ADMIN — VENLAFAXINE HYDROCHLORIDE 150 MG: 75 CAPSULE, EXTENDED RELEASE ORAL at 08:47

## 2022-03-02 RX ADMIN — ALPRAZOLAM 2 MG: 1 TABLET ORAL at 08:47

## 2022-03-02 RX ADMIN — ACETAMINOPHEN, ASPIRIN (NSAID) AND CAFFEINE 1 TABLET: 250; 250; 65 TABLET, FILM COATED ORAL at 12:48

## 2022-03-02 ASSESSMENT — PAIN SCALES - GENERAL
PAINLEVEL_OUTOF10: 0
PAINLEVEL_OUTOF10: 8
PAINLEVEL_OUTOF10: 2
PAINLEVEL_OUTOF10: 4
PAINLEVEL_OUTOF10: 5

## 2022-03-02 NOTE — PLAN OF CARE
Patient alert and oriented x 3. Patient rates Depression 10/10 and Anxiety 8/10. Patient denies SI/HI/A/V/H. Patient seclusive to her bed and room this evening. Patient refused to attended wrap up group. Patient doesn't have HS medications scheduled. Patient talking to her daughter on the phone was tearful and became anxious, because her dog is going to be put down tomorrow. 20:56 Patient requesting her Xanax for anxiety. Patient rates anxiety 7/10. Patient medicated with Xanax 2 mg po for anxiety. Patient resting quietly in bed.

## 2022-03-02 NOTE — BH NOTE
Pt requesting Excedrin for pain, per patient this is the only one that usually works. Communicated request to Hospitalist LORY.

## 2022-03-02 NOTE — FLOWSHEET NOTE
Purposeful Rounding    Patient Location: Patient room    Patient willing to engage in conversation: Yes    Presentation/behavior: Guarded/Suspicious and Withdrawn    Affect: Flat/blunted and Constricted    Concerns reported: None    PRN medications given: None    Environmental assessment: Room free from clutter, Clear path to bathroom  and Adequate lighting    Fall prevention interventions in place: Yellow non-skid socks on    Daily [place] Fall Risk Score: 47    Daily [place] Fall Risk Score: Low risk      Electronically signed by Annabelle Melara RN on 3/1/22 at 8:25 PM EST

## 2022-03-02 NOTE — FLOWSHEET NOTE
Purposeful Rounding    Patient Location: Day room    Patient willing to engage in conversation: Yes    Presentation/behavior: Anxious    Affect: Anxious    Concerns reported: pt tearful, asking if her daughter could still come and visit today. Patient educated on visiting hours.     PRN medications given: n/a    Environmental assessment: Room free from clutter, Clear path to bathroom  and No safety hazards noted    Fall prevention interventions in place: Yellow non-skid socks on    Daily [place] Fall Risk Score: 81    Daily [place] Fall Risk Score: 15-low      Electronically signed by Lory Dakins, RN on 3/1/22 at 7:09 PM EST

## 2022-03-02 NOTE — BH NOTE
BHI Shift Note     Shift: Day    Meals: 15 of breakfast,   Sleep: None, pt reports poor sleep overnight and     Is the Patient Amanda for Safety: Yes If no, what intervention?: n/a    Thought Process: Linear  Thought Content: Preoccupied    Hallucinations: None Explain: None noted or reported, she states when she does not sleep for days at a time \"I see things\" but not currently. Delusions: None Explain: None noted or reported. Groups Attended: Pt declined groups this shift, per pt she does not like being around big groups and doesn't like \"a lot of noise\"      Medication Compliant: Yes    PRNs Given:Tylenol, Excedrin     Narrative: Corazon Ellis was anxious,depressed, and tearful on assessment. She verbalized not being able to sleep overnight, pt patient she stayed awake overnight and read a book. She was tearful at times when talking about her past situation of being  and being homeless with kids when they were young. She also expressed feeling sad about her dog having to be put down due to having cancer, she reports this being as her main support as all her children have moved out from home.

## 2022-03-02 NOTE — FLOWSHEET NOTE
Purposeful Rounding    Patient Location: Patient room    Patient willing to engage in conversation: Yes    Presentation/behavior: Withdrawn    Affect: Flat/blunted    Concerns reported: pt requesting her water pitcher be filled.   Water pitcher provided    PRN medications given: n/a    Environmental assessment: Room free from clutter, Clear path to bathroom  and No safety hazards noted    Fall prevention interventions in place: Yellow non-skid socks on    Daily [place] Fall Risk Score: 81    Daily [place] Fall Risk Score: 15-low      Electronically signed by Isidoro Hebert RN on 3/1/22 at 7:11 PM EST

## 2022-03-03 PROCEDURE — 6370000000 HC RX 637 (ALT 250 FOR IP): Performed by: PSYCHIATRY & NEUROLOGY

## 2022-03-03 PROCEDURE — 99233 SBSQ HOSP IP/OBS HIGH 50: CPT | Performed by: PSYCHIATRY & NEUROLOGY

## 2022-03-03 PROCEDURE — 6370000000 HC RX 637 (ALT 250 FOR IP): Performed by: NURSE PRACTITIONER

## 2022-03-03 PROCEDURE — 1240000000 HC EMOTIONAL WELLNESS R&B

## 2022-03-03 RX ORDER — TRAZODONE HYDROCHLORIDE 50 MG/1
50 TABLET ORAL NIGHTLY PRN
Status: DISCONTINUED | OUTPATIENT
Start: 2022-03-03 | End: 2022-03-09 | Stop reason: HOSPADM

## 2022-03-03 RX ORDER — VENLAFAXINE HYDROCHLORIDE 75 MG/1
225 CAPSULE, EXTENDED RELEASE ORAL
Status: DISCONTINUED | OUTPATIENT
Start: 2022-03-04 | End: 2022-03-08

## 2022-03-03 RX ADMIN — ALPRAZOLAM 2 MG: 1 TABLET ORAL at 00:54

## 2022-03-03 RX ADMIN — ACETAMINOPHEN, ASPIRIN (NSAID) AND CAFFEINE 1 TABLET: 250; 250; 65 TABLET, FILM COATED ORAL at 20:59

## 2022-03-03 RX ADMIN — ACETAMINOPHEN, ASPIRIN (NSAID) AND CAFFEINE 1 TABLET: 250; 250; 65 TABLET, FILM COATED ORAL at 08:50

## 2022-03-03 RX ADMIN — ACETAMINOPHEN, ASPIRIN (NSAID) AND CAFFEINE 1 TABLET: 250; 250; 65 TABLET, FILM COATED ORAL at 00:54

## 2022-03-03 RX ADMIN — VENLAFAXINE HYDROCHLORIDE 150 MG: 75 CAPSULE, EXTENDED RELEASE ORAL at 08:49

## 2022-03-03 RX ADMIN — ALPRAZOLAM 2 MG: 1 TABLET ORAL at 08:50

## 2022-03-03 RX ADMIN — TRAZODONE HYDROCHLORIDE 50 MG: 50 TABLET ORAL at 20:59

## 2022-03-03 RX ADMIN — ALPRAZOLAM 2 MG: 1 TABLET ORAL at 20:59

## 2022-03-03 RX ADMIN — DEXTROAMPHETAMINE SACCHARATE, AMPHETAMINE ASPARTATE, DEXTROAMPHETAMINE SULFATE AND AMPHETAMINE SULFATE 30 MG: 2.5; 2.5; 2.5; 2.5 TABLET ORAL at 08:49

## 2022-03-03 ASSESSMENT — PAIN DESCRIPTION - LOCATION: LOCATION: HEAD

## 2022-03-03 ASSESSMENT — PAIN DESCRIPTION - PAIN TYPE: TYPE: CHRONIC PAIN;OTHER (COMMENT)

## 2022-03-03 ASSESSMENT — PAIN SCALES - GENERAL
PAINLEVEL_OUTOF10: 5
PAINLEVEL_OUTOF10: 5

## 2022-03-03 ASSESSMENT — PAIN DESCRIPTION - DESCRIPTORS: DESCRIPTORS: POUNDING;THROBBING

## 2022-03-03 NOTE — FLOWSHEET NOTE
Purposeful Rounding     Patient Location: Patient room     Patient willing to engage in conversation: No     Presentation/behavior: Other pt sleeping*     Affect: Neutral/Euthymic(normal)     Concerns reported: none     PRN medications given: None     Environmental assessment: Room free from clutter, Clear path to bathroom , Adequate lighting and No safety hazards noted     Fall prevention interventions in place: Yellow non-skid socks on     Daily [place] Fall Risk Score: 73     Daily [place] Fall Risk Score: 15

## 2022-03-03 NOTE — GROUP NOTE
Group Therapy Note    Date: 3/3/2022    Group Start Time: 1100  Group End Time: 1619  Group Topic: Cognitive Skills    [id number] [facility]        Group Therapy Note    Attendees: 9    Group members completed a work sheet where they listed various moments throughout their life time that they were proud of and then were given a \"Gratitude Journal\" assignment. Notes:  Yanira Williamson attended group for the full duration. Yanira Williamson completed the assignment and shared with the rest of the group. Yanira Williamson discussed various topics about her family situation and stating multiple times \"I came to group not willing to talk and now I can't stop. \"    Status After Intervention:  Improved    Participation Level: Monopolizing    Participation Quality: Appropriate      Speech:  pressured      Thought Process/Content: Logical      Affective Functioning: Exaggerated      Mood: anxious      Level of consciousness:  Alert      Response to Learning: Able to verbalize current knowledge/experience      Endings: None Reported    Modes of Intervention: Support, Socialization and Exploration      Discipline Responsible: [facility]      Signature:  DYLAN Bhardwaj

## 2022-03-03 NOTE — FLOWSHEET NOTE
Purposeful Rounding    Patient Location: Day room    Patient willing to engage in conversation: No    Presentation/behavior: Anxious    Affect: irritable    Concerns reported: none    PRN medications given: NA    Environmental assessment: No safety hazards noted    Fall prevention interventions in place: Yellow non-skid socks on    Daily [place] Fall Risk Score: 73    Daily [place] Fall Risk Score: 15      Electronically signed by Hayley Chandra RN on 3/3/22 at 1:15 AM EST

## 2022-03-03 NOTE — PROGRESS NOTES
amphetamine-dextroamphetamine  30 mg Oral Daily    venlafaxine  150 mg Oral Daily with breakfast      PRN:  aspirin-acetaminophen-caffeine, ALPRAZolam, acetaminophen, magnesium hydroxide, nicotine polacrilex, aluminum & magnesium hydroxide-simethicone     ASSESSMENT AND PLAN:    Principal Problem:    Major depressive disorder, recurrent, severe with psychotic features ([facility] [place] [address]  Active Problems:    Raynaud's disease without gangrene    Fibromyalgia    Tobacco use    Migraines    Panic disorder with agoraphobia    DELIA (generalized anxiety disorder)    Low TSH level  Resolved Problems:    Major depressive disorder without psychotic features     1  Admitted to inpatient psychiatry for stabilization and treatment. 2. On admission,  stopped Remeron and started Effexor for treatment of depression, anxiety. Continued Xanax (prn) and Adderall as prescribed for now. Order q. 15-minute checks for safety, programming, and p.r.n. medication for anxiety,  agitation and insomnia. 3.  Internal Medicine consult for admission. TSH low. Free T4 normal.   Fibromyalgia, raynaud's disease  - supportive care measures. Tylenol prn    4. Collateral information if available for diagnostic clarification and  coordination. 5.  Admitted on a statement of belief but agrees to stay voluntarily. Estimated length of stay 5-8 days. Total time with patient was 35 minutes and more than 50 % of that time was spent counseling the patient on their symptoms, treatment, and expected goals. An additional 30 minutes were spent with the patient mother, sister, and daughter (by phone) discussing the patient's history, symptom, and their concerns.     Elisabet Savage MD

## 2022-03-03 NOTE — BH NOTE
[facility]  Treatment Team Note  Review Date & Time: 3/3/22  0905    Patient was not in treatment team      Status EXAM:   Status and Exam  Normal: No  Facial Expression: Hostile  Affect: Blunt  Level of Consciousness: Alert  Mood:Normal: No  Mood: Depressed,Anxious,Sad  Motor Activity:Normal: No  Motor Activity: Decreased  Interview Behavior: Irritable  Preception: [place] to Person,[place] to Time,[place] to Place,[place] to Situation  Attention:Normal: Yes  Attention: Distractible  Thought Processes:  (Linear)  Thought Content:Normal: No  Thought Content: Preoccupations  Hallucinations: None  Delusions: No  Memory:Normal: Yes  Insight and Judgment: No  Insight and Judgment: Poor Judgment,Poor Insight  Present Suicidal Ideation: No  Present Homicidal Ideation: No      Suicide Risk CSSR-S:  1) Within the past month, have you wished you were dead or wished you could go to sleep and not wake up? : Yes  2) Have you actually had any thoughts of killing yourself? : Yes  3) Have you been thinking about how you might kill yourself? : Yes  5) Have you started to work out or worked out the details of how to kill yourself? Do you intend to carry out this plan? : Yes  6) Have you ever done anything, started to do anything, or prepared to do anything to end your life?: Yes      PLAN/TREATMENT RECOMMENDATIONS UPDATE: Patient will take medication as prescribed, eat 75% of meals, attend groups, participate in milieu activities, participate in treatment team and care planning for discharge and follow up.             Elida Telles RN

## 2022-03-03 NOTE — FLOWSHEET NOTE
Purposeful Rounding    Patient Location: Patient room    Patient willing to engage in conversation: No    Presentation/behavior: Other pt sleeping*    Affect: Neutral/Euthymic(normal)    Concerns reported: none    PRN medications given: None    Environmental assessment: Room free from clutter, Clear path to bathroom , Adequate lighting and No safety hazards noted    Fall prevention interventions in place: Yellow non-skid socks on    Daily [place] Fall Risk Score: 73    Daily [place] Fall Risk Score: 15      Electronically signed by Juan Ayala RN on 3/3/22 at 1:18 AM EST

## 2022-03-03 NOTE — FLOWSHEET NOTE
Purposeful Rounding     Patient Location: Patient room     Patient willing to engage in conversation: No     Presentation/behavior: Other pt sleeping*     Affect: Neutral/Euthymic(normal)     Concerns reported: none     PRN medications given: None     Environmental assessment: Room free from clutter, Clear path to bathroom , Adequate lighting and No safety hazards noted     Fall prevention interventions in place: Yellow non-skid socks on     Daily [place] Fall Risk Score: 73     Daily [place] Fall Risk Score: 15

## 2022-03-03 NOTE — PROGRESS NOTES
MD to have conference Call with family at the request of patient. The call will take place at 1300 today. Sister Addie Worley called this morning to confirm.  She wants to be called at this [phone]

## 2022-03-03 NOTE — PLAN OF CARE
Patient alert and oriented x 3. Patient rates Depression 9/10 and Anxiety 9/10. Patient visible in the milieu and talking on the phone all evening. Patient denies SI/HI/A/V/H. Patient refused to attended wrap up group. Patient doesn't have HS medications scheduled. Patient denies self harm. No c/o's voiced at present.

## 2022-03-03 NOTE — FLOWSHEET NOTE
Purposeful Rounding    Patient Location: Patient room    Patient willing to engage in conversation: Yes    Presentation/behavior: Pleasant    Affect: Neutral/Euthymic(normal)    Concerns reported: none    PRN medications given: none    Environmental assessment: Room free from clutter, Clear path to bathroom , Adequate lighting and No safety hazards noted    Fall prevention interventions in place: Yellow non-skid socks on    Daily [place] Fall Risk Score: 73    Daily [place] Fall Risk Score: 15      Electronically signed by Zuleyka Cordero RN on 3/3/22 at 6:03 AM EST

## 2022-03-03 NOTE — PROGRESS NOTES
Department of Psychiatry  Progress Note    Patient's chart was reviewed. Discussed with treatment team. Met with patient. SUBJECTIVE:      Did not sleep last night. Reports \"talking a lot more\" today. No other signs of acute switch. Remains depressed and anxious. When asked about her report of seeing shadows in her room, said she thought there was someone else in her room this morning. It resolved. No other psychotic experiences or symptoms.      Hopes to have a conference call with family tomorrow for collateral.     ROS:   Patient has new complaints: no  Sleeping adequately:  Yes   Appetite adequate: Yes  Engaged in programming: some    OBJECTIVE:  VITALS:  /89   Pulse 94   Temp 97.2 °F (36.2 °C) (Oral)   Resp 16   Ht 5' 2\" (1.575 m)   Wt 180 lb (81.6 kg)   LMP  (LMP Unknown)   SpO2 97%   Breastfeeding No   BMI 32.92 kg/m²     Mental Status Examination:    Appearance: fair grooming and hygiene  Behavior/Attitude toward examiner:  cooperative, attentive and fair eye contact  Speech: hyperverbal   Mood:  \"ok\"  Affect:  blunted     Thought processes:  circumstantial   Thought Content: less SI, no HI, no delusions voiced, no obsessions  Perceptions: no AVH  Attention: attention span and concentration were intact to interview   Abstraction: intact  Cognition:  Alert and oriented to person, place, time, and situation, recall intact  Insight: Limited   Judgment: Limited    Medication:  Scheduled:   amphetamine-dextroamphetamine  30 mg Oral Daily    venlafaxine  150 mg Oral Daily with breakfast      PRN:  aspirin-acetaminophen-caffeine, ALPRAZolam, acetaminophen, magnesium hydroxide, nicotine polacrilex, aluminum & magnesium hydroxide-simethicone     ASSESSMENT AND PLAN:    Principal Problem:    Major depressive disorder, recurrent, severe with psychotic features ([facility] [address]  Active Problems:    Raynaud's disease without gangrene    Fibromyalgia    Tobacco use    Migraines    Panic disorder with agoraphobia    DELIA (generalized anxiety disorder)    Low TSH level  Resolved Problems:    Major depressive disorder without psychotic features     1  Admitted to inpatient psychiatry for stabilization and treatment. 2. On admission,  stopped Remeron and started Effexor for treatment of depression, anxiety. Continued Xanax (prn) and Adderall as prescribed for now. Order q. 15-minute  checks for safety, programming, and p.r.n. medication for anxiety,  agitation and insomnia. 3.  Internal Medicine consult for admission. TSH low. Free T4 normal.     4.  Collateral information if available for diagnostic clarification and  coordination. 5.  Admitted on a statement of belief but agrees to stay voluntarily. Estimated length of stay 5-8 days. Total time with patient was 35 minutes and more than 50 % of that time was spent counseling the patient on their symptoms, treatment, and expected goals.      Maria Elena Fajardo MD

## 2022-03-03 NOTE — PLAN OF CARE
Problem: Suicide risk  Description: Suicide risk  Goal: Provide patient with safe environment  Description: Provide patient with safe environment  Outcome: Ongoing     Problem: Anxiety:  Goal: Level of anxiety will decrease  Description: Level of anxiety will decrease  Outcome: Ongoing     Problem: Pain:  Description: Pain management should include both nonpharmacologic and pharmacologic interventions. Goal: Pain level will decrease  Description: Pain level will decrease  Outcome: Ongoing     Problem: Altered Mood, Depressive Behavior:  Goal: Able to verbalize acceptance of life and situations over which he or she has no control  Description: Able to verbalize acceptance of life and situations over which he or she has no control  Outcome: Ongoing  Goal: Able to verbalize support systems  Description: Able to verbalize support systems  Outcome: Ongoing    Pt awoke and wanted her PRN Xanax and Excedrin right away. She was exhibiting tremors in her hands and tachypnea. She was given meds and the writer got her to open up and talk about what was going on that was causing such emotional response. She is very talkative despite reporting social anxiety that has prevented her from leaving house in over a year. Her sister called regarding the conference call with MD today. During conversation collateral info was given to this writer that she also has not bathed for over a year as well. Pts daughter came to see her today. After pt met with SW she did attend some groups today and spent less time on phone crying with family members. Pt also states that her dog of 12 years and her constant  was put to sleep yesterday. Pt only had PRN xanax one time today as of the writing of this note. No active SI/HI/AVH no RTIS. Ate far less than 25% of meals today and states she has no appetite.

## 2022-03-04 PROCEDURE — 6370000000 HC RX 637 (ALT 250 FOR IP): Performed by: PSYCHIATRY & NEUROLOGY

## 2022-03-04 PROCEDURE — 1240000000 HC EMOTIONAL WELLNESS R&B

## 2022-03-04 PROCEDURE — 99233 SBSQ HOSP IP/OBS HIGH 50: CPT | Performed by: PSYCHIATRY & NEUROLOGY

## 2022-03-04 PROCEDURE — 6370000000 HC RX 637 (ALT 250 FOR IP): Performed by: NURSE PRACTITIONER

## 2022-03-04 RX ORDER — ALPRAZOLAM 1 MG/1
1 TABLET ORAL 3 TIMES DAILY PRN
Status: DISCONTINUED | OUTPATIENT
Start: 2022-03-04 | End: 2022-03-09 | Stop reason: HOSPADM

## 2022-03-04 RX ADMIN — VENLAFAXINE HYDROCHLORIDE 225 MG: 75 CAPSULE, EXTENDED RELEASE ORAL at 09:13

## 2022-03-04 RX ADMIN — TRAZODONE HYDROCHLORIDE 50 MG: 50 TABLET ORAL at 20:48

## 2022-03-04 RX ADMIN — ALPRAZOLAM 2 MG: 1 TABLET ORAL at 09:20

## 2022-03-04 RX ADMIN — DEXTROAMPHETAMINE SACCHARATE, AMPHETAMINE ASPARTATE, DEXTROAMPHETAMINE SULFATE AND AMPHETAMINE SULFATE 30 MG: 2.5; 2.5; 2.5; 2.5 TABLET ORAL at 09:13

## 2022-03-04 RX ADMIN — ALPRAZOLAM 1 MG: 1 TABLET ORAL at 20:48

## 2022-03-04 RX ADMIN — ACETAMINOPHEN, ASPIRIN (NSAID) AND CAFFEINE 1 TABLET: 250; 250; 65 TABLET, FILM COATED ORAL at 09:13

## 2022-03-04 ASSESSMENT — PAIN SCALES - GENERAL: PAINLEVEL_OUTOF10: 6

## 2022-03-04 NOTE — PLAN OF CARE
Patient denies SI/HI, AVH. Patient isolative to room. Patient refused evening snack. Patient given Xanax 2 mg @ 2059 per request and Excedrin @2059 for migraine per request. Patient reports decrease in appetite. Patient resting in bed, voices no further concerns.

## 2022-03-04 NOTE — PROGRESS NOTES
Department of Psychiatry  Progress Note    Patient's chart was reviewed. Discussed with treatment team. Met with patient. Conference call with patient, mother, sister, and daughter. SUBJECTIVE:      debriefed yesterday's family meeting. Overall \"a little\" better but remains depression. Some participation in groups but also spending a lot of her time on the phone. Discussed her xanax prescription at length - agreed to reduce prn dose.      ROS:   Patient has new complaints: no  Sleeping adequately:  Yes   Appetite adequate: Yes  Engaged in programming: some    OBJECTIVE:  VITALS:  /76   Pulse 86   Temp 97.7 °F (36.5 °C) (Temporal)   Resp 16   Ht 5' 2\" (1.575 m)   Wt 180 lb (81.6 kg)   LMP  (LMP Unknown)   SpO2 96%   Breastfeeding No   BMI 32.92 kg/m²     Mental Status Examination:    Appearance: fair grooming and hygiene  Behavior/Attitude toward examiner:  cooperative, attentive and fair eye contact  Speech: hyperverbal   Mood:  \"ok\"  Affect:  tearful    Thought processes:  circumstantial   Thought Content: less SI, no HI, no delusions voiced, no obsessions  Perceptions: no AVH  Attention: attention span and concentration were intact to interview   Abstraction: intact  Cognition:  Alert and oriented to person, place, time, and situation, recall intact  Insight: Limited   Judgment: Limited    Medication:  Scheduled:   venlafaxine  225 mg Oral Daily with breakfast    amphetamine-dextroamphetamine  30 mg Oral Daily      PRN:  traZODone, aspirin-acetaminophen-caffeine, ALPRAZolam, acetaminophen, magnesium hydroxide, nicotine polacrilex, aluminum & magnesium hydroxide-simethicone     ASSESSMENT AND PLAN:    Principal Problem:    Major depressive disorder, recurrent, severe with psychotic features ([facility] [address]  Active Problems:    Raynaud's disease without gangrene    Fibromyalgia    Tobacco use    Migraines    Panic disorder with agoraphobia    DELIA (generalized anxiety disorder)    Low TSH level  Resolved Problems:    Major depressive disorder without psychotic features     1  Admitted to inpatient psychiatry for stabilization and treatment. 2. On admission,  stopped Remeron and started Effexor for treatment of depression, anxiety. Continued Xanax (prn) and Adderall as prescribed for now. Order q. 15-minute checks for safety, programming, and p.r.n. medication for anxiety, agitation and insomnia. 3/4/2022 - increase Effexor to 225mg. Reduce prn xanax to 1mg TID PRN. 3.  Internal Medicine consult for admission. TSH low. Free T4 normal.   Fibromyalgia, raynaud's disease  - supportive care measures. Tylenol prn    4. Collateral information if available for diagnostic clarification and  coordination. 5.  Admitted on a statement of belief but agrees to stay voluntarily. Estimated length of stay 5-8 days. [employer] DC early next week. Total time with patient was 35 minutes and more than 50 % of that time was spent counseling the patient on their symptoms, treatment, and expected goals.      Tigre Bedolla MD

## 2022-03-04 NOTE — PLAN OF CARE
Problem: Altered Mood, Depressive Behavior:  Goal: Able to verbalize and/or display a decrease in depressive symptoms  Description: Able to verbalize and/or display a decrease in depressive symptoms  Outcome: Ongoing   Visible this morning. In room this afternoon. Continues to present as depressed. Denied suicidal and homicidal ideations. Denied hallucinations.

## 2022-03-04 NOTE — GROUP NOTE
Group Therapy Note    Date: 3/4/2022    Group Start Time: 1100  Group End Time: 1150  Group Topic: Psychoeducation    [address] [facility],[address], [place]        Group Therapy Note    Attendees: 7    Participants learned to make a \"treasure map\" with a beginning spot and a success spot. Patient's then shared what their personal path has looked like. Notes:  Shavonne Padron attended group and was engaged and participated in the group discussion and activity. Shavonne Padron shared a lot about her past with the group and received positive support from the group.      Status After Intervention:  Improved    Participation Level: Interactive    Participation Quality: Appropriate, Attentive and Sharing      Speech:  pressured      Thought Process/Content: Logical      Affective Functioning: Congruent      Mood: anxious and depressed      Level of consciousness:  Alert, Oriented x4 and Attentive      Response to Learning: Able to verbalize/acknowledge new learning and Progressing to goal      Endings: None Reported    Modes of Intervention: Education and Activity      Discipline Responsible: /Counselor      Signature:  BYRON Crawford

## 2022-03-05 PROCEDURE — 6370000000 HC RX 637 (ALT 250 FOR IP): Performed by: PSYCHIATRY & NEUROLOGY

## 2022-03-05 PROCEDURE — 6370000000 HC RX 637 (ALT 250 FOR IP): Performed by: NURSE PRACTITIONER

## 2022-03-05 PROCEDURE — 1240000000 HC EMOTIONAL WELLNESS R&B

## 2022-03-05 RX ADMIN — VENLAFAXINE HYDROCHLORIDE 225 MG: 75 CAPSULE, EXTENDED RELEASE ORAL at 08:12

## 2022-03-05 RX ADMIN — DEXTROAMPHETAMINE SACCHARATE, AMPHETAMINE ASPARTATE, DEXTROAMPHETAMINE SULFATE AND AMPHETAMINE SULFATE 30 MG: 2.5; 2.5; 2.5; 2.5 TABLET ORAL at 08:12

## 2022-03-05 RX ADMIN — TRAZODONE HYDROCHLORIDE 50 MG: 50 TABLET ORAL at 20:41

## 2022-03-05 RX ADMIN — ALPRAZOLAM 1 MG: 1 TABLET ORAL at 08:11

## 2022-03-05 RX ADMIN — ALPRAZOLAM 1 MG: 1 TABLET ORAL at 20:41

## 2022-03-05 RX ADMIN — ACETAMINOPHEN, ASPIRIN (NSAID) AND CAFFEINE 1 TABLET: 250; 250; 65 TABLET, FILM COATED ORAL at 08:12

## 2022-03-05 ASSESSMENT — PAIN SCALES - GENERAL
PAINLEVEL_OUTOF10: 7
PAINLEVEL_OUTOF10: 6

## 2022-03-05 NOTE — GROUP NOTE
Group Therapy Note    Date: 3/4/2022    Group Start Time: 2045  Group End Time: 2105  Group Topic: [facility]        Group Therapy Note    Attendees: Goals and importance of goal setting discussed. Night time milieu activities discussed. Patient's Goal:  Attend a group    Notes:  Successful     Status After Intervention:  Improved    Participation Level:  Active Listener    Participation Quality: Appropriate and Attentive      Speech:  hesitant      Thought Process/Content: Logical  Linear      Affective Functioning: Congruent      Mood: anxious      Level of consciousness:  Alert and Oriented x4      Response to Learning: Progressing to goal      Endings: None Reported    Modes of Intervention: Support      Discipline Responsible: [employer]      Signature:  Mike Gibbs

## 2022-03-05 NOTE — PLAN OF CARE
Problem: Altered Mood, Depressive Behavior:  Goal: Able to verbalize and/or display a decrease in depressive symptoms  Description: Able to verbalize and/or display a decrease in depressive symptoms  3/5/2022 0205 by Jennifer Sofia RN  Outcome: Ongoing     Problem: Altered Mood, Depressive Behavior:  Goal: Absence of self-harm  Description: Absence of self-harm  Outcome: Ongoing   Linwood Memory has been out of her room more this shift and did attend evening group. Patient reports she still feels depressed but denies current SI/HI. Denies A/V/H. Patient states \" My mind just doesn't stop. \" PRN Xanax requested/given for anxiety and PRN Trazodone requested/given for sleep. Medications effective.

## 2022-03-05 NOTE — BH NOTE
BHI Shift Note     Shift: Day    Meals: 30% of breakfast, 20% of lunch  Sleep: Reported 6 hours of sleep this shift. Is the Patient Amanda for Safety: Yes If no, what intervention?: n/a    Thought Process: Circumstantial  Thought Content: Preoccupied    Hallucinations: None Explain: None noted or reported. Delusions: None Explain: None noted or reported. Groups Attended: Vanessa participated in all groups this shift. Medication Compliant: Yes. PRNs Given: Xanax, Excedrin    Narrative: Vanessa was calm and cooperative, she had a brightened affect, and stated \"I am talking a lot since starting the new medication\" (Effexor), she also verbalized having increased anxiety at times. After lunch however, pt became more isolative to room and was observed reading a book. Denies all.

## 2022-03-06 PROCEDURE — 6370000000 HC RX 637 (ALT 250 FOR IP): Performed by: PSYCHIATRY & NEUROLOGY

## 2022-03-06 PROCEDURE — 1240000000 HC EMOTIONAL WELLNESS R&B

## 2022-03-06 PROCEDURE — 99232 SBSQ HOSP IP/OBS MODERATE 35: CPT | Performed by: PSYCHIATRY & NEUROLOGY

## 2022-03-06 RX ADMIN — DEXTROAMPHETAMINE SACCHARATE, AMPHETAMINE ASPARTATE, DEXTROAMPHETAMINE SULFATE AND AMPHETAMINE SULFATE 30 MG: 2.5; 2.5; 2.5; 2.5 TABLET ORAL at 09:50

## 2022-03-06 RX ADMIN — ALPRAZOLAM 1 MG: 1 TABLET ORAL at 21:06

## 2022-03-06 RX ADMIN — VENLAFAXINE HYDROCHLORIDE 225 MG: 75 CAPSULE, EXTENDED RELEASE ORAL at 09:50

## 2022-03-06 RX ADMIN — TRAZODONE HYDROCHLORIDE 50 MG: 50 TABLET ORAL at 21:06

## 2022-03-06 RX ADMIN — ALPRAZOLAM 1 MG: 1 TABLET ORAL at 09:56

## 2022-03-06 ASSESSMENT — PAIN SCALES - GENERAL: PAINLEVEL_OUTOF10: 0

## 2022-03-06 ASSESSMENT — PAIN DESCRIPTION - PAIN TYPE: TYPE: CHRONIC PAIN

## 2022-03-06 NOTE — PROGRESS NOTES
Patient reports she only urinated once on day shift and has not urinated since. Performed bladder scan which showed 458 ml of urine. Placed hat in toilet and encouraged patient to try to void. Patient was able to void 400 ml of clear, yellow urine. No odor noted. Left hat in toilet to monitor output. Encouraged fluids and provided pitcher of fresh ice water. Will monitor.

## 2022-03-06 NOTE — BH NOTE
BHI Shift Note     Shift: Day    Meals: 0% of breakfast,   Sleep: Reports poor sleep. Is the Patient Amanda for Safety: Yes If no, what intervention?: n/a    Thought Process: Circumstantial   Thought Content: Preoccupations     Hallucinations: None Explain: Denies at this time. Delusions: None Explain: Denies at this time. Groups Attended: Ashleigh Rader encouraged to attend unit programming this shift. Medication Compliant: Yes    PRNs Given:Xanax     Narrative: Ashleigh Rader was anxious and cooperative, she was withdrawn to room most of the shift and verbalized \"not feeling good\" pt reported feeling like the Effexor was the cause of her decreased appetite. Pt was encouraged to eat her breakfast due to minimal food intake history, pt refused. She was observed drinking water and did use the restroom in the morning with 300 urine output. Will continue to encourage food and fluids.

## 2022-03-06 NOTE — PLAN OF CARE
Problem: Altered Mood, Depressive Behavior:  Goal: Able to verbalize and/or display a decrease in depressive symptoms  Description: Able to verbalize and/or display a decrease in depressive symptoms  Outcome: Ongoing     Problem: Altered Mood, Depressive Behavior:  Goal: Absence of self-harm  Description: Absence of self-harm  Outcome: Ongoing   Cash Brown has been mostly isolative to her room this shift. Patient denies current SI/HI, denies A/V/H. Did not attend group. Declined evening snack but drinking fluids. Reports she has been having difficulty swallowing and had some trouble swallowing medications whole. States she has been also having some difficulty urinating. Encouraged fluids and offered pudding and ice cream. Patient states \" I just don't feel well. \"

## 2022-03-06 NOTE — PROGRESS NOTES
Department of Psychiatry  AttendingProgress Note    Chief Complaint: depression    Stays mainly in her room. Says she is still depressed. Taking medications and tolerating them well. She is tolerating the lowered dose of xanax. The treatment team met and discussed the treatment plan. SUBJECTIVE:        Suicidal ideation:  denies suicidal ideation. Patient does not have medication side effects. ROS: Patient has new complaints: no  Sleeping adequately:  Yes   Appetite adequate: Yes  Attending groups: No:       OBJECTIVE    Physical  Vitals:    Blood pressure 113/73, pulse 90, temperature 97.1 °F (36.2 °C), temperature source Infrared, resp. rate 16, height 5' 2\" (1.575 m), weight 180 lb (81.6 kg), SpO2 99 %, not currently breastfeeding.   General appearance:  [x]  appears age, []  appears older than stated age,     [x]  adequately dressed and groomed, [] disheveled,                [x]  in no acute distress, [] appears mildly distressed,      []  Other:      MUSCULOSKELETAL:   Gait:   [] normal, [] antalgic, [] unsteady, [] in bed, gait not evaluated   Station:  [] erect, [] sitting, [x] recumbent, [] other        Strength/tone:  [x] muscle strength and tone appear consistent with age and condition     [] atrophy      [] abnormal movements  PSYCHIATRIC:    Relatedness:  [] cooperative, [] guarded, [x] indifferent, [] hostile,      [] sedated  Speech:  [x] normal prosody, [] pressured, [x] decreased volume,    [] slurred, [] halting, [] slowed, [] loud     [] echolalia, [] incoherent, [] stuttering   Eye contact:  [] direct, [x] avoidant, [] intense  Kinetics:  [] normal, [] increased, [x] decreased  Mood:   [] euthymic, [x] depressed, [] anxious, [] irritable,     [] labile  Affect:   [] normal range, [] constricted, [x] depressed, [] anxious,     [] angry, [] blunted, [] flat  Hallucinations  [x] denies, [] auditory,  [] visual,  [] olfactory, [] tactile  Delusions  [x] none, [] grandiose,  [] jealous,  [] persecutory,  [] somatic,     [] bizarre  Preoccupations  [] none, [] violence, [] obsessions, [] other     Suicidal ideation  [x] denies, [] endorses  Homicidal ideation [x] denies, [] endorses  Thought process: [x] logical, [] circumstantial, [] tangential     [] concrete, [] disorganized  Associations:  [x] logical and coherent, [] loosening, [] disorganized  Insight:   [] good, [x] fair, [] poor  Judgment:  [] good, [x] fair, [] poor  Attention and concentration:     [x] intact, [] limited, [] able to focus on interview,     [] grossly impaired  Orientation:  [x] person, place, time, situation     [] disoriented to:     Memory:  Remote memory [x] intact, [] impaired     Recent memory  [x] intact, [] impaired          Data  Labs:   Admission on 02/28/2022   Component Date Value Ref Range Status    Ventricular Rate 02/28/2022 87  BPM Final    Atrial Rate 02/28/2022 87  BPM Final    P-R Interval 02/28/2022 134  ms Final    QRS Duration 02/28/2022 72  ms Final    Q-T Interval 02/28/2022 376  ms Final    QTc Calculation (Bazett) 02/28/2022 452  ms Final    P Axis 02/28/2022 4  degrees Final    R Axis 02/28/2022 40  degrees Final    T Axis 02/28/2022 27  degrees Final    Diagnosis 02/28/2022 Normal sinus rhythmBaseline artifactNonspecific ST abnormalityNo previous ECGs availableConfirmed by HIEU RUSHING, BALDEMAR ([id number]) on 3/1/2022 7:40:37 AM   Final    WBC 02/28/2022 7.2  4.0 - 11.0 K/uL Final    RBC 02/28/2022 5.24* 4.00 - 5.20 M/uL Final    Hemoglobin 02/28/2022 15.4  12.0 - 16.0 g/dL Final    Hematocrit 02/28/2022 45.2  36.0 - 48.0 % Final    MCV 02/28/2022 86.3  80.0 - 100.0 fL Final    MCH 02/28/2022 29.4  26.0 - 34.0 pg Final    MCHC 02/28/2022 34.1  31.0 - 36.0 g/dL Final    RDW 02/28/2022 13.3  12.4 - 15.4 % Final    Platelets 01/96/4116 251  135 - 450 K/uL Final    MPV 02/28/2022 9.0  5.0 - 10.5 fL Final    Neutrophils % 02/28/2022 79.8  % Final    Lymphocytes % 02/28/2022 11.4  % Final    Monocytes % 02/28/2022 7.2  % Final    Eosinophils % 02/28/2022 0.8  % Final    Basophils % 02/28/2022 0.8  % Final    Neutrophils Absolute 02/28/2022 5.8  1.7 - 7.7 K/uL Final    Lymphocytes Absolute 02/28/2022 0.8* 1.0 - 5.1 K/uL Final    Monocytes Absolute 02/28/2022 0.5  0.0 - 1.3 K/uL Final    Eosinophils Absolute 02/28/2022 0.1  0.0 - 0.6 K/uL Final    Basophils Absolute 02/28/2022 0.1  0.0 - 0.2 K/uL Final    Sodium 02/28/2022 140  136 - 145 mmol/L Final    Potassium reflex Magnesium 02/28/2022 3.9  3.5 - 5.1 mmol/L Final    Chloride 02/28/2022 105  99 - 110 mmol/L Final    CO2 02/28/2022 24  21 - 32 mmol/L Final    Anion Gap 02/28/2022 11  3 - 16 Final    Glucose 02/28/2022 115* 70 - 99 mg/dL Final    BUN 02/28/2022 6* 7 - 20 mg/dL Final    CREATININE 02/28/2022 0.7  0.6 - 1.1 mg/dL Final    GFR Non- 02/28/2022 >60  >60 Final    Comment: >60 mL/min/1.73m2 EGFR, calc. for ages 25 and older using the  MDRD formula (not corrected for weight), is valid for stable  renal function.  GFR  02/28/2022 >60  >60 Final    Comment: Chronic Kidney Disease: less than 60 ml/min/1.73 sq.m. Kidney Failure: less than 15 ml/min/1.73 sq.m. Results valid for patients 18 years and older.       Calcium 02/28/2022 9.1  8.3 - 10.6 mg/dL Final    Total Protein 02/28/2022 7.3  6.4 - 8.2 g/dL Final    Albumin 02/28/2022 3.8  3.4 - 5.0 g/dL Final    Albumin/Globulin Ratio 02/28/2022 1.1  1.1 - 2.2 Final    Total Bilirubin 02/28/2022 0.3  0.0 - 1.0 mg/dL Final    Alkaline Phosphatase 02/28/2022 156* 40 - 129 U/L Final    ALT 02/28/2022 34  10 - 40 U/L Final    AST 02/28/2022 33  15 - 37 U/L Final    hCG Qual 02/28/2022 Negative  Detects HCG level >10 MIU/mL Final    Amphetamine Screen, Urine 02/28/2022 Neg  Negative <1000ng/mL Final    Barbiturate Screen, Ur 02/28/2022 Neg  Negative <200 ng/mL Final    Benzodiazepine Screen, Urine 02/28/2022 POSITIVE* Negative <200 ng/mL Final    Cannabinoid Scrn, Ur 02/28/2022 Neg  Negative <50 ng/mL Final    Cocaine Metabolite Screen, Urine 02/28/2022 Neg  Negative <300 ng/mL Final    Opiate Scrn, Ur 02/28/2022 Neg  Negative <300 ng/mL Final    Comment: \"Therapeutic levels of pain medication, especially oxycontin and synthetic  opioids, may not be detected by this Methodology. Pain management screen  panel  Drug panel-PM-Hi Res Ur, Interp (PAIN) should be considered for drug  monitoring \".  PCP Screen, Urine 02/28/2022 Neg  Negative <25 ng/mL Final    Methadone Screen, Urine 02/28/2022 Neg  Negative <300 ng/mL Final    Propoxyphene Scrn, Ur 02/28/2022 Neg  Negative <300 ng/mL Final    Oxycodone Urine 02/28/2022 Neg  Negative <100 ng/ml Final    pH, UA 02/28/2022 7.0   Final    Comment: Urine pH less than 5.0 or greater than 8.0 may indicate sample adulteration. Another sample should be collected if clinically  indicated.  Drug Screen Comment: 02/28/2022 see below   Final    Comment: This method is a screening test to detect only these drug  classes as part of a medical workup. Confirmatory testing  by another method should be ordered if clinically indicated.  Ethanol Lvl 02/28/2022 None Detected  mg/dL Final    Comment:    None Detected  Conversion factor:  100 mg/dl = .100 g/dl  For Medical Purposes Only      Salicylate, Serum 68/88/0695 0.7* 15.0 - 30.0 mg/dL Final    Comment: Therapeutic Range: 15.0-30.0 mg/dL  Toxic: >30.0 mg/dL      Acetaminophen Level 02/28/2022 <5* 10 - 30 ug/mL Final    Comment: Therapeutic Range: 10.0-30.0 ug/mL  Toxic: >=150 ug/mL      SARS-CoV-2, NAAT 02/28/2022 Not Detected  Not Detected Final    Comment: Rapid NAAT:   Negative results should be treated as presumptive and,  if inconsistent with clinical signs and symptoms or necessary for  patient management, should be tested with an alternative molecular  assay.  Negative results do not preclude SARS-CoV-2 infection and  should not be used as the sole basis for patient management decisions. This test has been authorized by the FDA under an Emergency Use  Authorization (EUA) for use by authorized laboratories.     Fact sheet for Healthcare Providers:  Macho  Fact sheet for Patients: Federico.saniya    METHODOLOGY: Isothermal Nucleic Acid Amplification      Salicylate, Serum 54/59/2459 <0.3* 15.0 - 30.0 mg/dL Final    Comment: Therapeutic Range: 15.0-30.0 mg/dL  Toxic: >30.0 mg/dL      Cholesterol, Total 02/28/2022 270* 0 - 199 mg/dL Final    Triglycerides 02/28/2022 185* 0 - 150 mg/dL Final    HDL 02/28/2022 37* 40 - 60 mg/dL Final    LDL Calculated 02/28/2022 196* <100 mg/dL Final    VLDL Cholesterol Calculated 02/28/2022 37  Not Established mg/dL Final    Hemoglobin A1C 02/28/2022 5.2  See comment % Final    Comment: Comment:  Diagnosis of Diabetes: > or = 6.5%  Increased risk of diabetes (Prediabetes): 5.7-6.4%  Glycemic Control: Nonpregnant Adults: <7.0%                    Pregnant: <6.0%        eAG 02/28/2022 102.5  mg/dL Final    TSH 02/28/2022 <0.01* 0.27 - 4.20 uIU/mL Final    T4 Free 02/28/2022 1.0  0.9 - 1.8 ng/dL Final            Medications  Current Facility-Administered Medications: ALPRAZolam (XANAX) tablet 1 mg, 1 mg, Oral, TID PRN  traZODone (DESYREL) tablet 50 mg, 50 mg, Oral, Nightly PRN  [COMPLETED] venlafaxine (EFFEXOR XR) extended release capsule 75 mg, 75 mg, Oral, Daily with breakfast **FOLLOWED BY** venlafaxine (EFFEXOR XR) extended release capsule 225 mg, 225 mg, Oral, Daily with breakfast  aspirin-acetaminophen-caffeine (EXCEDRIN MIGRAINE) per tablet 1 tablet, 1 tablet, Oral, BID PRN  amphetamine-dextroamphetamine (ADDERALL) tablet 30 mg, 30 mg, Oral, Daily  acetaminophen (TYLENOL) tablet 650 mg, 650 mg, Oral, Q4H PRN  magnesium hydroxide (MILK OF MAGNESIA) 400 MG/5ML suspension 30 mL, 30 mL, Oral, Daily PRN  nicotine polacrilex (COMMIT) lozenge 2 mg, 2 mg, Oral, Q1H PRN  aluminum & magnesium hydroxide-simethicone (MAALOX) 200-200-20 MG/5ML suspension 30 mL, 30 mL, Oral, Q6H PRN    ASSESSMENT AND PLAN    Principal Problem:    Major depressive disorder, recurrent, severe with psychotic features ([address]  Active Problems:    Raynaud's disease without gangrene    Fibromyalgia    Tobacco use    Migraines    Panic disorder with agoraphobia    DELIA (generalized anxiety disorder)    Low TSH level  Resolved Problems:    Major depressive disorder without psychotic features       Mainly stays to self. Affect still very depressed and she does not report any improvement. Is tolerating the lowered dose of xanax. 1.Patient's symptoms show no change  2. Probable discharge is next week  3. Discharge planning is progressing  4.  Suicidal ideation is not present     Continue Effexor 225 mg daily  Continue Adderall 30 mg daily  Continue xanax 1 mg TID PRN

## 2022-03-07 PROBLEM — E44.0 MODERATE PROTEIN-CALORIE MALNUTRITION (HCC): Status: ACTIVE | Noted: 2022-03-07

## 2022-03-07 PROCEDURE — 99233 SBSQ HOSP IP/OBS HIGH 50: CPT | Performed by: PSYCHIATRY & NEUROLOGY

## 2022-03-07 PROCEDURE — 1240000000 HC EMOTIONAL WELLNESS R&B

## 2022-03-07 PROCEDURE — 6370000000 HC RX 637 (ALT 250 FOR IP): Performed by: PSYCHIATRY & NEUROLOGY

## 2022-03-07 RX ORDER — PRAZOSIN HYDROCHLORIDE 1 MG/1
1 CAPSULE ORAL NIGHTLY
Status: DISCONTINUED | OUTPATIENT
Start: 2022-03-07 | End: 2022-03-08

## 2022-03-07 RX ADMIN — ALPRAZOLAM 1 MG: 1 TABLET ORAL at 10:06

## 2022-03-07 RX ADMIN — PRAZOSIN HYDROCHLORIDE 1 MG: 1 CAPSULE ORAL at 21:25

## 2022-03-07 RX ADMIN — VENLAFAXINE HYDROCHLORIDE 225 MG: 75 CAPSULE, EXTENDED RELEASE ORAL at 10:05

## 2022-03-07 RX ADMIN — ALPRAZOLAM 1 MG: 1 TABLET ORAL at 21:21

## 2022-03-07 RX ADMIN — DEXTROAMPHETAMINE SACCHARATE, AMPHETAMINE ASPARTATE, DEXTROAMPHETAMINE SULFATE AND AMPHETAMINE SULFATE 30 MG: 2.5; 2.5; 2.5; 2.5 TABLET ORAL at 10:08

## 2022-03-07 ASSESSMENT — PAIN SCALES - GENERAL: PAINLEVEL_OUTOF10: 0

## 2022-03-07 NOTE — PLAN OF CARE
Nutrition Problem #1: Moderate malnutrition  Intervention: Food and/or Nutrient Delivery: Continue Current Diet,Start Oral Nutrition Supplement  Nutritional Goals: patient will consume 50% or greater of meals on ADULT DIET;  Regular diet order x 3 meals per day + accept and consume 50% or greater of Ensure HP with icecream as a milkshake with meals + 50% or greater of Magic Cups with meals

## 2022-03-07 NOTE — PROGRESS NOTES
Comprehensive Nutrition Assessment    Type and Reason for Visit:  Initial,Consult,RD Nutrition Re-Screen/LOS (LOS x 7; Consult Poor appetite/intake for 5 days or more)    Nutrition Recommendations/Plan:   1. Continue ADULT DIET: Regular  2. Add (Chocolate) Ensure HP with ice cream as a milkshake with all meals + add (chocolate) Magic cups with all meals  3. Monitor appetite, meal intake and acceptance/intake of ONS  4. Monitor weight trends, bowel function, mental status and POC. Nutrition Assessment:  Patient is nutritionally compromised AEB mild muscle loss present, mental health decline- noted pt had not come out of her house, stopped bathing, became disengaged, and stopped cooking x ~1 year PTA (Per daughter), and patient with decreased appetite + minimal PO intake x 7 days admission, and patient remains at risk for further compromise d/t pt c/o of swallowing difficulty, ongoing decreased appetite/intake + ongoing mental health compromise; Will continue ADULT DIET; Regular and add Ensure HP with icecream as a milkshake with meals + add Magic Cups with meals + requested RN to place SLP consult    Malnutrition Assessment:  Malnutrition Status:   Moderate malnutrition (per guidelines from Academy of Nutrition and Dietetics (Academy)/American Society for Parenteral and Enteral Nutrition (A.S.P.E.N.) - clinical characteristics that the clinician can  obtain and document to support a diagnosis of malnutrition.)  Context:  Acute Illness     Findings of the 6 clinical characteristics of malnutrition:  Energy Intake:  7 - 50% or less of estimated energy requirements for 5 or more days (minimal PO intake x 7 days admission)  Weight Loss:  Unable to assess (lack of weight hx per EMR)     Body Fat Loss:  No significant body fat loss     Muscle Mass Loss:  1 - Mild muscle mass loss Temples (temporalis),Hand (interosseous),Clavicles (pectoralis & deltoids)  Fluid Accumulation:  No significant fluid accumulation     Strength:  Not Performed    Estimated Daily Nutrient Needs:  Energy (kcal):  4882-0908 kcals based on 23-25 kcals/kg/CBW; Weight Used for Energy Requirements:  Current     Protein (g):  65-75 g protein based on 1.3-1.5 g/kg/IBW; Weight Used for Protein Requirements:  Ideal        Fluid (ml/day):  0019-2982 ml; Method Used for Fluid Requirements:  1 ml/kcal      Nutrition Related Findings:  Met with pt in room; pt reports that she has not had an appetite + minimal PO intake x 7 days admission + that she has been experiencing some difficulty swallowing; made RN aware + requested SLP consult; Pt reports that the smell or thought of food makes her \"gag\"; pt reports that she didnt have these issues PTA + thinks it may be a side effect of medication; per RN, pt has not eaten anything today, consumed ~20% of breakfast on 3/6 and 10% of lunch on 3/6; pt reports that the Ensure texture is too thick/metallic tasting; agreeable to Ensure HP with icecream as a milkshake with meals + Magic cups with meals; pt reports UBW ~185#, reported last weighed ~1 month ago; pt very surprised to see updated CBW today; noted PTA, pt reported she had not come out of her house in more than 1 year + noted per daughter, pt stopped bathing + cooking over the past 1 year; Wounds:  None       Current Nutrition Therapies:    ADULT DIET; Regular    Anthropometric Measures:  · Height: 5' 2\" (157.5 cm)  · Current Body Weight: 159 lb (72.1 kg) (obtained 3/7/22; stansding scale, actual)   · Admission Body Weight: 180 lb (81.6 kg) (obtained 2/28/22; stated weight)    · Usual Body Weight: 185 lb (83.9 kg) (pt reports UBW ~185# x ~1 month ago; limited weight hx per EMR;)     · Ideal Body Weight: 110 lbs; % Ideal Body Weight 144.5 %   · BMI: 29.1  · BMI Categories: Overweight (BMI 25.0-29. 9)       Nutrition Diagnosis:   · Moderate malnutrition related to inadequate protein-energy intake,psychological cause or life stress as evidenced by other (comment),weight loss,poor intake prior to admission,mild loss of subcutaneous fat (mental health compromise)    Nutrition Interventions:   Food and/or Nutrient Delivery:  Continue Current Diet,Start Oral Nutrition Supplement  Nutrition Education/Counseling:  No recommendation at this time   Coordination of Nutrition Care:  Continue to monitor while inpatient    Goals:  patient will consume 50% or greater of meals on ADULT DIET;  Regular diet order x 3 meals per day + accept and consume 50% or greater of Ensure HP with icecream as a milkshake with meals + 50% or greater of Magic Cups with meals       Nutrition Monitoring and Evaluation:   Behavioral-Environmental Outcomes:  None Identified   Food/Nutrient Intake Outcomes:  Food and Nutrient Intake,Supplement Intake  Physical Signs/Symptoms Outcomes:  Meal Time Behavior,Weight,Nutrition Focused Physical Findings     Discharge Planning:    Continue current diet     Electronically signed by David Mcghee RD, LD on 3/7/22 at 5:51 PM EST    Contact: [phone]

## 2022-03-07 NOTE — GROUP NOTE
Group Therapy Note    Date: 3/7/2022    Group Start Time: 1100  Group End Time: 0265  Group Topic: Cognitive Skills    [id number] [facility]        Group Therapy Note    Attendees: 12    Group members used yarn and created connections with other members of the group by answering various topics. Notes:  Addison Stringer entered group late. During her time in the group, she completed the activity and interacted appropriately with other members of the group. Status After Intervention:  Improved    Participation Level:  Active Listener and Interactive    Participation Quality: Appropriate and Attentive      Speech:  Pressured     Thought Process/Content: Logical      Affective Functioning: Congruent      Mood: euthymic      Level of consciousness:  Alert      Response to Learning: Able to verbalize current knowledge/experience      Endings: None Reported    Modes of Intervention: Socialization, Exploration and Activity      Discipline Responsible: Behavorial Health Tech      Signature:  DYLAN Hurst

## 2022-03-07 NOTE — BH NOTE
BHI Shift Note     Shift: Day    Meals: 0% of breakfast, 0% of lunch. is drinking fluids. Sleep: Decreased sleep overnight. Is the Patient Maanda for Safety: Yes If no, what intervention?: n/a    Thought Process: Circumstantial  Thought Content: Preoccupied    Hallucinations: None Explain: None noted or reported. Delusions: None Explain: None noted or reporeted. Groups Attended: Jacques Fausto Attended all groups available this shift. Medication Compliant: Yes    PRNs Given:Atarax    Narrative: Pt was calm and cooperative at start of shift, however, she continues to report anxiety and decreased appetite. Pt verbalized wanting to attend all programming this shift because she did not want to let the doctor down.

## 2022-03-07 NOTE — GROUP NOTE
Group Therapy Note    Date: 3/7/2022    Group Start Time: 1600  Group End Time: 2132  Group Topic: Healthy Living/Wellness    [address], RN        Group Therapy Note    Attendees: 6         Patient's Goal:  To talk about self awareness and how it helps with mental illness      Notes:  Pt was appropriate in group and had insight and good problem solving. Status After Intervention:  Improved    Participation Level:  Active Listener and Interactive    Participation Quality: Appropriate, Attentive, Sharing and Supportive      Speech:  normal      Thought Process/Content: Logical  Linear      Affective Functioning: Congruent      Mood: euthymic      Level of consciousness:  Alert, Oriented x4 and Attentive      Response to Learning: Able to verbalize current knowledge/experience, Able to verbalize/acknowledge new learning, Able to retain information and Capable of insight      Endings: None Reported    Modes of Intervention: Education and Problem-solving      Discipline Responsible: Registered Nurse      Signature:  Mikael Vargas, MSN,ED,RN-[facility]

## 2022-03-07 NOTE — PLAN OF CARE
Problem: Altered Mood, Depressive Behavior:  Goal: Able to verbalize and/or display a decrease in depressive symptoms  Description: Able to verbalize and/or display a decrease in depressive symptoms  Outcome: Ongoing     Problem: Altered Mood, Depressive Behavior:  Goal: Absence of self-harm  Description: Absence of self-harm  Outcome: Ongoing   [place] has been isolative to her room this shift. Did not attend group. Denies current SI/HI, denies A/V/H. Medication compliant and absent of self harm. Remains flat and sad but pleasant on approach.

## 2022-03-07 NOTE — PROGRESS NOTES
Department of Psychiatry  Progress Note    Patient's chart was reviewed. Discussed with treatment team. Met with patient. Conference call with patient, mother, sister, and daughter. SUBJECTIVE:      Continues with severe anxiety and dysphoria. Some dependent personality traits. Also endorses numerous symptoms of PTSD including hypervigilance, re-experiencing, etc. When we were walking to meet today, a peer walked up behind her and politely said \"excuse me. \" The patient screamed out in fear, and then nearly slid to the ground, SOB, tearful, and tremulous. Her reaction was so severe, it seemed exaggerated. She does have an extensive trauma history. No significant change on reduce dose of Xanax.      ROS:   Patient has new complaints: no  Sleeping adequately:  Yes   Appetite adequate: Yes  Engaged in programming: some    OBJECTIVE:  VITALS:  /69   Pulse 86   Temp 97.8 °F (36.6 °C) (Temporal)   Resp 16   Ht 5' 2\" (1.575 m)   Wt 180 lb (81.6 kg)   LMP  (LMP Unknown)   SpO2 99%   Breastfeeding No   BMI 32.92 kg/m²     Mental Status Examination:    Appearance: fair grooming and hygiene  Behavior/Attitude toward examiner:  cooperative, attentive and fair eye contact  Speech: hyperverbal   Mood:  \"terrible\"  Affect:  tearful    Thought processes:  circumstantial   Thought Content: less SI, no HI, no delusions voiced, no obsessions  Perceptions: no AVH  Attention: attention span and concentration were intact to interview   Abstraction: intact  Cognition:  Alert and oriented to person, place, time, and situation, recall intact  Insight: Limited   Judgment: Limited    Medication:  Scheduled:   venlafaxine  225 mg Oral Daily with breakfast    amphetamine-dextroamphetamine  30 mg Oral Daily      PRN:  ALPRAZolam, traZODone, aspirin-acetaminophen-caffeine, acetaminophen, magnesium hydroxide, nicotine polacrilex, aluminum & magnesium hydroxide-simethicone     ASSESSMENT AND PLAN:    Principal Problem:    Major depressive disorder, recurrent, severe with psychotic features ([address]  Active Problems:    Raynaud's disease without gangrene    Fibromyalgia    Tobacco use    Migraines    Panic disorder with agoraphobia    DELIA (generalized anxiety disorder)    Low TSH level  Resolved Problems:    Major depressive disorder without psychotic features     1  Admitted to inpatient psychiatry for stabilization and treatment. 2. On admission,  stopped Remeron and started Effexor for treatment of depression, anxiety. Continued Xanax (prn) and Adderall as prescribed. Ordered q. 15-minute checks for safety, programming, and p.r.n. medication for anxiety, agitation and insomnia. 3/4/2022 - increased Effexor to 225mg. Reduced prn xanax to 1mg TID PRN.  3/7/2022 - DC adderall (not indicated at this point and may be making her symptoms worse). Start Minipress 1mg QHS    3. Internal Medicine consult for admission. TSH low. Free T4 normal.   Fibromyalgia, raynaud's disease  - supportive care measures. Tylenol prn    4. Admitted on a statement of belief but agreed to stay voluntarily. Estimated length of stay 8-10 days. Total time with patient was 35 minutes and more than 50 % of that time was spent counseling the patient on their symptoms, treatment, and expected goals.      Bee Morillo MD

## 2022-03-07 NOTE — GROUP NOTE
Group Therapy Note    Date: 3/7/2022    Group Start Time: 1000  Group End Time: 3965  Group Topic: Psychoeducation    DYLAN Guzman        Group Therapy Note  Provider introduced the 24 character strengths to the group for them to identify their top strength. Once they shared their strength and described the positive feelings they get from using the strength, they sonia their feelings on a blank head. They discussed the pictures and the strengths after the activity. Attendees: 10         Patient's Goal:      Notes:  Patient was cooperative and fully engaged in the discussion and activity. She identified honesty as her top strength. Status After Intervention:  Unchanged    Participation Level:  Active Listener    Participation Quality: Lethargic      Speech:  pressured      Thought Process/Content: Linear      Affective Functioning: Flat      Mood: depressed      Level of consciousness:  Alert      Response to Learning: Capable of insight      Endings: None Reported    Modes of Intervention: Education      Discipline Responsible: /Counselor      Signature:  DYLAN Triana

## 2022-03-08 PROCEDURE — 99233 SBSQ HOSP IP/OBS HIGH 50: CPT | Performed by: PSYCHIATRY & NEUROLOGY

## 2022-03-08 PROCEDURE — 6370000000 HC RX 637 (ALT 250 FOR IP): Performed by: PSYCHIATRY & NEUROLOGY

## 2022-03-08 PROCEDURE — 1240000000 HC EMOTIONAL WELLNESS R&B

## 2022-03-08 RX ORDER — VENLAFAXINE HYDROCHLORIDE 75 MG/1
75 CAPSULE, EXTENDED RELEASE ORAL ONCE
Status: COMPLETED | OUTPATIENT
Start: 2022-03-08 | End: 2022-03-08

## 2022-03-08 RX ORDER — VENLAFAXINE HYDROCHLORIDE 75 MG/1
300 CAPSULE, EXTENDED RELEASE ORAL
Status: DISCONTINUED | OUTPATIENT
Start: 2022-03-09 | End: 2022-03-09 | Stop reason: HOSPADM

## 2022-03-08 RX ORDER — PRAZOSIN HYDROCHLORIDE 1 MG/1
2 CAPSULE ORAL NIGHTLY
Status: DISCONTINUED | OUTPATIENT
Start: 2022-03-08 | End: 2022-03-09

## 2022-03-08 RX ADMIN — VENLAFAXINE HYDROCHLORIDE 75 MG: 75 CAPSULE, EXTENDED RELEASE ORAL at 15:28

## 2022-03-08 RX ADMIN — VENLAFAXINE HYDROCHLORIDE 225 MG: 75 CAPSULE, EXTENDED RELEASE ORAL at 08:36

## 2022-03-08 RX ADMIN — ALPRAZOLAM 1 MG: 1 TABLET ORAL at 20:00

## 2022-03-08 RX ADMIN — ALPRAZOLAM 1 MG: 1 TABLET ORAL at 08:36

## 2022-03-08 RX ADMIN — PRAZOSIN HYDROCHLORIDE 2 MG: 1 CAPSULE ORAL at 20:00

## 2022-03-08 ASSESSMENT — PAIN SCALES - GENERAL: PAINLEVEL_OUTOF10: 0

## 2022-03-08 NOTE — FLOWSHEET NOTE
Pt states her xanax has helped her anxiety bringing in down from a 6 to a 4, she is resting in bed calm and cooperative

## 2022-03-08 NOTE — FLOWSHEET NOTE
Assumed care of patient at this time.     Purposeful Rounding    Patient Location: Day room    Patient willing to engage in conversation: Yes    Presentation/behavior: Anxious    Affect: Flat/blunted and Sad    Concerns reported: pt, moved to the big side of the unit, tearful and worried    PRN medications given: n/a    Environmental assessment: Room free from clutter, Clear path to bathroom  and No safety hazards noted    Fall prevention interventions in place: Yellow non-skid socks on    Daily [place] Fall Risk Score: 67    Daily [place] Fall Risk Score: 15-low      Electronically signed by Lory Dakins, RN on 3/8/22 at 3:35 PM EST

## 2022-03-08 NOTE — CARE COORDINATION
[facility]  Treatment Team Note  Weekly    Review Date & Time: 0900  3/8/2022    Patient was not in treatment team      Status EXAM:   Status and Exam  Normal: No  Facial Expression: Avoids Gaze,Flat,Expressionless  Affect: Appropriate  Level of Consciousness: Alert  Mood:Normal: No  Mood: Depressed,Anxious  Motor Activity:Normal: No  Motor Activity: Decreased  Interview Behavior: Cooperative  Preception: [place] to Person,[place] to Time,[place] to [place] to Place  Attention:Normal: Yes  Attention:  (wnl)  Thought Processes:  (Linear)  Thought Content:Normal: Yes  Thought Content:  (wnl)  Hallucinations: None  Delusions: No  Memory:Normal: Yes  Memory: Other(See comment) (wnl)  Insight and Judgment: No  Insight and Judgment: Poor Judgment,Poor Insight,Unmotivated  Present Suicidal Ideation: No  Present Homicidal Ideation: No      Suicide Risk CSSR-S:  1) Within the past month, have you wished you were dead or wished you could go to sleep and not wake up? : Yes  2) Have you actually had any thoughts of killing yourself? : Yes  3) Have you been thinking about how you might kill yourself? : Yes  5) Have you started to work out or worked out the details of how to kill yourself? Do you intend to carry out this plan? : Yes  6) Have you ever done anything, started to do anything, or prepared to do anything to end your life?: Yes      PLAN/TREATMENT RECOMMENDATIONS UPDATE: Patient will take medication as prescribed, eat 75% of meals, attend groups, participate in milieu activities, participate in treatment team and care planning for discharge and follow up.         Zoila Gaytan RN

## 2022-03-08 NOTE — PROGRESS NOTES
Patient was resting in her bed & was reading, when writer entered her room at 21:14, to give her 21:00 scheduled medications, to her. Patient reported feeling anxious. \"I'm anxious inside. I was about to come out & get my xanax. \" Patient was given xanax 1 mg po at 21:21.  Henok Staples R.N.

## 2022-03-08 NOTE — FLOWSHEET NOTE
Purposeful Rounding    Patient Location: Patient room    Patient willing to engage in conversation: Yes    Presentation/behavior: cooperative    Affect: Flat/blunted, Sad and Depressed    Concerns reported: none    PRN medications given: none    Environmental assessment: Room free from clutter, Clear path to bathroom  and No safety hazards noted    Fall prevention interventions in place: Yellow non-skid socks on    Daily [place] Fall Risk Score: 67    Daily [place] Fall Risk Score: 15      Electronically signed by Karl Castillo RN on 3/8/22 at 11:49 AM EST

## 2022-03-08 NOTE — PLAN OF CARE
Problem: Tobacco Use:  Goal: Inpatient tobacco use cessation counseling participation  Description: Inpatient tobacco use cessation counseling participation  Outcome: Ongoing     Problem: Anxiety:  Goal: Level of anxiety will decrease  Description: Level of anxiety will decrease  3/8/2022 1008 by Yenni Smith RN  Outcome: Ongoing     Problem: Pain:  Goal: Pain level will decrease  Description: Pain level will decrease  Outcome: Ongoing     Problem: Pain:  Goal: Control of acute pain  Description: Control of acute pain  Outcome: Ongoing     Problem: Pain:  Goal: Control of chronic pain  Description: Control of chronic pain  Outcome: Ongoing     Problem: Altered Mood, Depressive Behavior:  Goal: Able to verbalize acceptance of life and situations over which he or she has no control  Description: Able to verbalize acceptance of life and situations over which he or she has no control  Outcome: Ongoing     Problem: Altered Mood, Depressive Behavior:  Goal: Absence of self-harm  Description: Absence of self-harm  Outcome: Ongoing     Problem: Altered Mood, Depressive Behavior:  Goal: Participates in care planning  Description: Participates in care planning  Outcome: Ongoing     Problem: Nutrition  Goal: Optimal nutrition therapy  Outcome: Ongoing     Problem: Nutrition  Goal: Understanding of nutritional guidelines  Outcome: Ongoing     Pt cooperative, pt having anxiety this AM and given her xanax for anxiety, upon reassessment she states her anxiety has decreased from a 6 to a 4, denies SI/HI/AVH

## 2022-03-08 NOTE — PROGRESS NOTES
Received a call from patient's sister, Ronel Kitchen. Ronel Kitchen did have a patient code but is not listed on the patient's SHIRLEY. This writer let the patient know that her sister was on the phone and would like to speak with her. Patient declined to take the call. Stated that we could tell Ronel Kitchen that she was not feeling well but we were not to give out any medical information. Ronel Kitchen stated that the patient's daughter was here for a visit and cut the visit short due to patient's behavior. Patient's daughter stated that the patient had told her during the visit that she had lost 30 pounds since admission and that she could possibly have to have a feeding tube placed. Patient has had adequate nutritional intake since admission, and has not been told that a feeding tube would be placed.

## 2022-03-08 NOTE — PROGRESS NOTES
protein-calorie malnutrition ([facility] [address]    PTSD (post-traumatic stress disorder)  Resolved Problems:    Major depressive disorder without psychotic features     1  Admitted to inpatient psychiatry for stabilization and treatment. 2. On admission,  stopped Remeron and started Effexor for treatment of depression, anxiety. Continued Xanax (prn) and Adderall as prescribed. Ordered q. 15-minute checks for safety, programming, and p.r.n. medication for anxiety, agitation and insomnia. 3/4/2022 - increased Effexor to 225mg. Reduced prn xanax to 1mg TID PRN.  3/7/2022 - discontinued adderall (not indicated at this point and may be making her symptoms worse). Started Minipress 1mg QHS  3/8/2022 - increased Effexor to 300mg daily. Increased Minipress to 2mg QHS. 3.  Internal Medicine consult for admission. TSH low. Free T4 normal.   Fibromyalgia, raynaud's disease  - supportive care measures. Tylenol prn    4. Admitted on a statement of belief but agreed to stay voluntarily. Estimated length of stay 8-10 days. Total time with patient was 35 minutes and more than 50 % of that time was spent counseling the patient on their symptoms, treatment, and expected goals.      Ze Howard MD

## 2022-03-08 NOTE — FLOWSHEET NOTE
Purposeful Rounding    Patient Location: Nurses station    Patient willing to engage in conversation: Yes    Presentation/behavior:     Affect: Flat/blunted, Anxious and Sad    Concerns reported: pt states she has anxiety this morning, up at nurses station for meds    PRN medications given:xanax 1mg    Environmental assessment: Room free from clutter, Clear path to bathroom  and No safety hazards noted    Fall prevention interventions in place: Yellow non-skid socks on    Daily [place] Fall Risk Score: 67    Daily [place] Fall Risk Score:15      Electronically signed by Marciano Maldonado RN on 3/8/22 at 8:48 AM EST

## 2022-03-08 NOTE — FLOWSHEET NOTE
Purposeful Rounding    Patient Location: Patient room    Patient willing to engage in conversation: No    Presentation/behavior: Other resting*    Affect: Neutral/Euthymic(normal)    Concerns reported: none, pt noted to be resting, eyes closed, respirations even and easy    PRN medications given: n/a    Environmental assessment: Room free from clutter, Clear path to bathroom  and No safety hazards noted    Fall prevention interventions in place: Yellow non-skid socks on    Daily [place] Fall Risk Score: 67    Daily [place] Fall Risk Score: 15-low      Electronically signed by Johanne Lofton RN on 3/8/22 at 5:57 PM EST

## 2022-03-08 NOTE — PLAN OF CARE
Patient has been mostly regressed to her room & bed. Patient was noted with a very flat affect & talked slowly in 1:1. Patient denied having suicidal ideation during 1:1. Patient reported that her daughter was staying with her, but moved out 2 & 1/2 months ago. \"I haven't been out of my house for 1 year. I have been eating food, from what my daughter had got me, before she left. Patient was  noted with body odor & was encouraged to bathe. \"I changed clothes & combed my hair. \" Patient was somewhat anxious about starting on minipress at 21:00, but did take it. Patient has appeared asleep since 00:15.  Dora Staples R.N. [Melena] : no melena [Joint Pain] : joint pain [Joint Stiffness] : joint stiffness [Memory Loss] : memory loss [Unsteady Walking] : ataxia [Negative] : Heme/Lymph

## 2022-03-08 NOTE — GROUP NOTE
Group Therapy Note    Date: 3/8/2022    Group Start Time: 1100  Group End Time: 1595  Group Topic: Psychoeducation    [facility] [place] [facility]    DYLAN Ahumada        Group Therapy Note    Clinician prepared a group for strengthening relationships that the members requested in the morning meeting. Clinician went to every door to invite members to the group, turned off the tv, let everyone in the main room know it was group time. No one attended. Clinician came back out 10 minutes later and announced to everyone it was group time for a second time and no one attended. Attendees: 0         Patient's Goal:      Notes:  Patient was invited to group but chose not to attend.        Status After Intervention:  Unchanged    Participation Level: None    Participation Quality: Lethargic      Speech:  mute      Thought Process/Content: Linear      Affective Functioning: Flat      Mood: depressed      Level of consciousness:  Alert      Response to Learning: Capable of insight      Endings: None Reported    Modes of Intervention: Education      Discipline Responsible: /Counselor      Signature:  DYLAN Ahumada

## 2022-03-09 VITALS
DIASTOLIC BLOOD PRESSURE: 60 MMHG | RESPIRATION RATE: 18 BRPM | HEART RATE: 106 BPM | SYSTOLIC BLOOD PRESSURE: 91 MMHG | OXYGEN SATURATION: 97 % | WEIGHT: 159 LBS | HEIGHT: 62 IN | BODY MASS INDEX: 29.26 KG/M2 | TEMPERATURE: 97.7 F

## 2022-03-09 PROBLEM — F43.10 PTSD (POST-TRAUMATIC STRESS DISORDER): Status: ACTIVE | Noted: 2022-03-09

## 2022-03-09 PROCEDURE — 5130000000 HC BRIDGE APPOINTMENT

## 2022-03-09 PROCEDURE — 6370000000 HC RX 637 (ALT 250 FOR IP): Performed by: PSYCHIATRY & NEUROLOGY

## 2022-03-09 RX ORDER — PRAZOSIN HYDROCHLORIDE 1 MG/1
1 CAPSULE ORAL NIGHTLY
Status: DISCONTINUED | OUTPATIENT
Start: 2022-03-09 | End: 2022-03-09 | Stop reason: HOSPADM

## 2022-03-09 RX ORDER — PRAZOSIN HYDROCHLORIDE 1 MG/1
1 CAPSULE ORAL NIGHTLY
Qty: 15 CAPSULE | Refills: 0 | Status: SHIPPED | OUTPATIENT
Start: 2022-03-09 | End: 2022-03-24

## 2022-03-09 RX ORDER — ALPRAZOLAM 1 MG/1
1 TABLET ORAL DAILY PRN
Qty: 10 TABLET | Refills: 0 | Status: SHIPPED | OUTPATIENT
Start: 2022-03-09 | End: 2022-03-19

## 2022-03-09 RX ORDER — VENLAFAXINE HYDROCHLORIDE 150 MG/1
300 CAPSULE, EXTENDED RELEASE ORAL
Qty: 30 CAPSULE | Refills: 0 | Status: SHIPPED | OUTPATIENT
Start: 2022-03-10 | End: 2022-03-25

## 2022-03-09 RX ADMIN — ALPRAZOLAM 1 MG: 1 TABLET ORAL at 09:10

## 2022-03-09 RX ADMIN — VENLAFAXINE HYDROCHLORIDE 300 MG: 75 CAPSULE, EXTENDED RELEASE ORAL at 09:10

## 2022-03-09 ASSESSMENT — PAIN SCALES - GENERAL: PAINLEVEL_OUTOF10: 0

## 2022-03-09 NOTE — PLAN OF CARE
teachback method.      Referral for Outpatient Tobacco Cessation Counseling, upon discharge (sabrina X if applicable and completed):    ( )  Hospital staff assisted patient to call Quit Line or faxed referral                                   during hospitalization                  ( )  Recognizing danger situations (included triggers and roadblocks), if not completed on admission                    ( )  Coping skills (new ways to manage stress, exercise, relaxation techniques, changing routine, distraction), if not completed on admission                                                           ( )  Basic information about quitting (benefits of quitting, techniques in how to quit, available resources, if not completed on admission  ( ) Referral for counseling faxed to [facility]   ( ) Patient refused referral  ( ) Patient refused counseling  (x ) Patient refused smoking cessation medication upon discharge    Vaccinations (sabrina X if applicable and completed):  ( ) Patient states already received influenza vaccine elsewhere  ( ) Patient received influenza vaccine during this hospitalization  (x ) Patient refused influenza vaccine at this time

## 2022-03-09 NOTE — FLOWSHEET NOTE
Purposeful Rounding    Patient Location: Patient room    Patient willing to engage in conversation: Yes    Presentation/behavior: Anxious and Withdrawn    Affect: Flat/blunted and Anxious    Concerns reported: nonoe    PRN medications given: denies at this time    Environmental assessment: Room free from clutter, Clear path to bathroom  and No safety hazards noted    Fall prevention interventions in place: Yellow non-skid socks on    Daily [place] Fall Risk Score: 75    Daily [place] Fall Risk Score: 15      Electronically signed by Juice Cunha RN on 3/9/22 at 8:36 AM EST

## 2022-03-09 NOTE — FLOWSHEET NOTE
Purposeful Rounding    Patient Location: Patient room    Patient willing to engage in conversation: no    Presentation/behavior: sleeping    Affect: Flat/blunted    Concerns reported:     PRN medications given:   Environmental assessment: Room free from clutter, Clear path to bathroom  and No safety hazards noted    Fall prevention interventions in place: Yellow non-skid socks on    Daily [place] Fall Risk Score: 75    Daily [place] Fall Risk Score: 15    Electronically signed by Seymour Geller RN on 3/9/22 at 2:03 PM EST

## 2022-03-09 NOTE — BH NOTE
Purposeful Rounding    Patient Location: Patient room    Patient willing to engage in conversation: Yes    Presentation/behavior: Anxious    Affect: Flat/blunted and Anxious    Concerns reported:  Anxiety    PRN medications given: Xanax    Environmental assessment: Room free from clutter, Clear path to bathroom  and Adequate lighting    Fall prevention interventions in place: Lighting appropriate, Room free of clutter and Clear path to bathroom    Daily [place] Fall Risk Score: 67    Daily [place] Fall Risk Score: 15      Electronically signed by Dylan Trevino RN on 3/8/22 at 10:14 PM EST   3

## 2022-03-09 NOTE — FLOWSHEET NOTE
Purposeful Rounding     Patient Location: Patient room     Patient willing to engage in conversation: No     Presentation/behavior: Other sleeping*     Affect: Neutral/Euthymic(normal)     Concerns reported:      PRN medications given:      Environmental assessment: No safety hazards noted     Fall prevention interventions in place: Lighting appropriate, Room free of clutter and Clear path to bathroom     Daily [place] Fall Risk Score: 67     Daily [place] Fall Risk Score: 15

## 2022-03-09 NOTE — PLAN OF CARE
Problem: Tobacco Use:  Goal: Inpatient tobacco use cessation counseling participation  Description: Inpatient tobacco use cessation counseling participation  3/9/2022 0916 by Chintan Sewell RN  Outcome: Ongoing     Problem: Anxiety:  Goal: Level of anxiety will decrease  Description: Level of anxiety will decrease  3/9/2022 0916 by Chintan Sewell RN  Outcome: Ongoing     Problem: Pain:  Goal: Pain level will decrease  Description: Pain level will decrease  3/9/2022 0916 by Chintan Sewell RN  Outcome: Ongoing     Problem: Pain:  Goal: Control of acute pain  Description: Control of acute pain  3/9/2022 0916 by Chintan Sewell RN  Outcome: Ongoing     Problem: Pain:  Goal: Control of chronic pain  Description: Control of chronic pain  3/9/2022 0916 by Chintan Sewell RN  Outcome: Ongoing     Problem: Altered Mood, Depressive Behavior:  Goal: Able to verbalize acceptance of life and situations over which he or she has no control  Description: Able to verbalize acceptance of life and situations over which he or she has no control  3/9/2022 0916 by Chintan Sewell RN  Outcome: Ongoing     Problem: Altered Mood, Depressive Behavior:  Goal: Able to verbalize and/or display a decrease in depressive symptoms  Description: Able to verbalize and/or display a decrease in depressive symptoms  3/9/2022 0916 by Chintan Sewell RN  Outcome: Ongoing     Problem: Altered Mood, Depressive Behavior:  Goal: Able to verbalize support systems  Description: Able to verbalize support systems  3/9/2022 0916 by Chintan Sewell RN  Outcome: Ongoing     Problem: Altered Mood, Depressive Behavior:  Goal: Absence of self-harm  Description: Absence of self-harm  3/9/2022 0916 by Chintan Sewell RN  Outcome: Ongoing     Problem: Altered Mood, Depressive Behavior:  Goal: Participates in care planning  Description: Participates in care planning  3/9/2022 0916 by Chintan Sewell RN  Outcome: Ongoing     Problem: Nutrition  Goal: Optimal nutrition therapy  3/9/2022 0916 by Bradford Pina, FELECIA  Outcome: Ongoing     Problem: Nutrition  Goal: Understanding of nutritional guidelines  3/9/2022 0916 by Bradford Pina RN  Outcome: Ongoing     Pt in room, flat affect and crying, states \"I want to go home\", she states she is anxious and request her morning medication and Xanax, see MAR, denies SI/HI/AVH, she set her daily goal of \"brush my teeth\", encouraged her to shower and join groups, attempted to provide her breakfast tray and she refused, she has no c/o pain at this time

## 2022-03-09 NOTE — FLOWSHEET NOTE
Purposeful Rounding    Patient Location: Patient room    Patient willing to engage in conversation: Yes    Presentation/behavior: Anxious    Affect: Flat/blunted and Depressed    Concerns reported: none    PRN medications given: none    Environmental assessment: Room free from clutter, Clear path to bathroom  and No safety hazards noted    Fall prevention interventions in place: Yellow non-skid socks on    Daily [place] Fall Risk Score: 75    Daily [place] Fall Risk Score: 15      Electronically signed by Wilmra Mallory RN on 3/9/22 at 12:01 PM EST

## 2022-03-09 NOTE — FLOWSHEET NOTE
Purposeful Rounding    Patient Location: Patient room    Patient willing to engage in conversation: No    Presentation/behavior: Other sleeping*    Affect: Neutral/Euthymic(normal)    Concerns reported:     PRN medications given:     Environmental assessment: No safety hazards noted    Fall prevention interventions in place: Lighting appropriate, Room free of clutter and Clear path to bathroom    Daily [place] Fall Risk Score: 67    Daily [place] Fall Risk Score: 15      Electronically signed by Jaret Pruett RN on 3/9/22 at 2:57 AM EST

## 2022-03-09 NOTE — PLAN OF CARE
Lexus has been isolated to room tonight. Denies SI/HI and denies hallucinations. Feeling anxious due to being moved from the small side to big side. She stated \"I get anxious around larger groups and I have panic attacks. \" Requested her PRN Xanax which was given and was effective. Medication compliant with her scheduled Prazosin. Flat affect but appreciative and cooperative. Will continue to monitor.
